# Patient Record
Sex: MALE | Race: WHITE | NOT HISPANIC OR LATINO | Employment: UNEMPLOYED | URBAN - METROPOLITAN AREA
[De-identification: names, ages, dates, MRNs, and addresses within clinical notes are randomized per-mention and may not be internally consistent; named-entity substitution may affect disease eponyms.]

---

## 2020-04-18 ENCOUNTER — APPOINTMENT (OUTPATIENT)
Dept: CARDIOLOGY | Facility: MEDICAL CENTER | Age: 45
DRG: 247 | End: 2020-04-18
Attending: INTERNAL MEDICINE
Payer: COMMERCIAL

## 2020-04-18 ENCOUNTER — HOSPITAL ENCOUNTER (INPATIENT)
Facility: MEDICAL CENTER | Age: 45
LOS: 2 days | DRG: 247 | End: 2020-04-20
Attending: EMERGENCY MEDICINE | Admitting: INTERNAL MEDICINE
Payer: COMMERCIAL

## 2020-04-18 ENCOUNTER — APPOINTMENT (OUTPATIENT)
Dept: RADIOLOGY | Facility: MEDICAL CENTER | Age: 45
DRG: 247 | End: 2020-04-18
Payer: COMMERCIAL

## 2020-04-18 ENCOUNTER — APPOINTMENT (OUTPATIENT)
Dept: CARDIOLOGY | Facility: MEDICAL CENTER | Age: 45
DRG: 247 | End: 2020-04-18
Payer: COMMERCIAL

## 2020-04-18 DIAGNOSIS — I21.3 ST ELEVATION MYOCARDIAL INFARCTION (STEMI), UNSPECIFIED ARTERY (HCC): ICD-10-CM

## 2020-04-18 DIAGNOSIS — I21.11 ST ELEVATION MYOCARDIAL INFARCTION INVOLVING RIGHT CORONARY ARTERY (HCC): ICD-10-CM

## 2020-04-18 PROBLEM — Z72.0 TOBACCO ABUSE: Status: ACTIVE | Noted: 2020-04-18

## 2020-04-18 PROBLEM — E87.1 HYPONATREMIA: Status: ACTIVE | Noted: 2020-04-18

## 2020-04-18 PROBLEM — E87.6 HYPOKALEMIA: Status: ACTIVE | Noted: 2020-04-18

## 2020-04-18 LAB
ALBUMIN SERPL BCP-MCNC: 4 G/DL (ref 3.2–4.9)
ALBUMIN/GLOB SERPL: 1.3 G/DL
ALP SERPL-CCNC: 54 U/L (ref 30–99)
ALT SERPL-CCNC: 18 U/L (ref 2–50)
ANION GAP SERPL CALC-SCNC: 17 MMOL/L (ref 7–16)
APTT PPP: 25.1 SEC (ref 24.7–36)
AST SERPL-CCNC: 47 U/L (ref 12–45)
BASOPHILS # BLD AUTO: 0 % (ref 0–1.8)
BASOPHILS # BLD: 0 K/UL (ref 0–0.12)
BILIRUB SERPL-MCNC: 0.5 MG/DL (ref 0.1–1.5)
BUN SERPL-MCNC: 12 MG/DL (ref 8–22)
BURR CELLS BLD QL SMEAR: NORMAL
CALCIUM SERPL-MCNC: 9.1 MG/DL (ref 8.5–10.5)
CHLORIDE SERPL-SCNC: 98 MMOL/L (ref 96–112)
CO2 SERPL-SCNC: 24 MMOL/L (ref 20–33)
CREAT SERPL-MCNC: 1.4 MG/DL (ref 0.5–1.4)
EKG IMPRESSION: NORMAL
EKG IMPRESSION: NORMAL
EOSINOPHIL # BLD AUTO: 0.09 K/UL (ref 0–0.51)
EOSINOPHIL NFR BLD: 0.9 % (ref 0–6.9)
ERYTHROCYTE [DISTWIDTH] IN BLOOD BY AUTOMATED COUNT: 46.9 FL (ref 35.9–50)
GLOBULIN SER CALC-MCNC: 3.1 G/DL (ref 1.9–3.5)
GLUCOSE SERPL-MCNC: 145 MG/DL (ref 65–99)
HCT VFR BLD AUTO: 43.6 % (ref 42–52)
HGB BLD-MCNC: 15 G/DL (ref 14–18)
INR PPP: 1.01 (ref 0.87–1.13)
LIPASE SERPL-CCNC: 22 U/L (ref 11–82)
LV EJECT FRACT  99904: 55
LV EJECT FRACT MOD 2C 99903: 41.67
LV EJECT FRACT MOD 4C 99902: 41.15
LV EJECT FRACT MOD BP 99901: 39.55
LYMPHOCYTES # BLD AUTO: 4.65 K/UL (ref 1–4.8)
LYMPHOCYTES NFR BLD: 44.3 % (ref 22–41)
MAGNESIUM SERPL-MCNC: 2 MG/DL (ref 1.5–2.5)
MANUAL DIFF BLD: NORMAL
MCH RBC QN AUTO: 28.5 PG (ref 27–33)
MCHC RBC AUTO-ENTMCNC: 34.4 G/DL (ref 33.7–35.3)
MCV RBC AUTO: 82.9 FL (ref 81.4–97.8)
MONOCYTES # BLD AUTO: 0.82 K/UL (ref 0–0.85)
MONOCYTES NFR BLD AUTO: 7.8 % (ref 0–13.4)
MORPHOLOGY BLD-IMP: NORMAL
NEUTROPHILS # BLD AUTO: 4.94 K/UL (ref 1.82–7.42)
NEUTROPHILS NFR BLD: 47 % (ref 44–72)
NRBC # BLD AUTO: 0 K/UL
NRBC BLD-RTO: 0 /100 WBC
NT-PROBNP SERPL IA-MCNC: 1392 PG/ML (ref 0–125)
PLATELET # BLD AUTO: 193 K/UL (ref 164–446)
PLATELET BLD QL SMEAR: NORMAL
PMV BLD AUTO: 11.2 FL (ref 9–12.9)
POIKILOCYTOSIS BLD QL SMEAR: NORMAL
POTASSIUM SERPL-SCNC: 3.2 MMOL/L (ref 3.6–5.5)
PROT SERPL-MCNC: 7.1 G/DL (ref 6–8.2)
PROTHROMBIN TIME: 13.5 SEC (ref 12–14.6)
RBC # BLD AUTO: 5.26 M/UL (ref 4.7–6.1)
RBC BLD AUTO: PRESENT
SODIUM SERPL-SCNC: 139 MMOL/L (ref 135–145)
TROPONIN T SERPL-MCNC: 402 NG/L (ref 6–19)
VARIANT LYMPHS BLD QL SMEAR: NORMAL
WBC # BLD AUTO: 10.5 K/UL (ref 4.8–10.8)

## 2020-04-18 PROCEDURE — 700111 HCHG RX REV CODE 636 W/ 250 OVERRIDE (IP)

## 2020-04-18 PROCEDURE — 80053 COMPREHEN METABOLIC PANEL: CPT

## 2020-04-18 PROCEDURE — 99285 EMERGENCY DEPT VISIT HI MDM: CPT

## 2020-04-18 PROCEDURE — 93306 TTE W/DOPPLER COMPLETE: CPT | Mod: 26 | Performed by: INTERNAL MEDICINE

## 2020-04-18 PROCEDURE — 93005 ELECTROCARDIOGRAM TRACING: CPT

## 2020-04-18 PROCEDURE — 700102 HCHG RX REV CODE 250 W/ 637 OVERRIDE(OP): Performed by: INTERNAL MEDICINE

## 2020-04-18 PROCEDURE — 770020 HCHG ROOM/CARE - TELE (206)

## 2020-04-18 PROCEDURE — 94760 N-INVAS EAR/PLS OXIMETRY 1: CPT

## 2020-04-18 PROCEDURE — 700111 HCHG RX REV CODE 636 W/ 250 OVERRIDE (IP): Performed by: INTERNAL MEDICINE

## 2020-04-18 PROCEDURE — 700102 HCHG RX REV CODE 250 W/ 637 OVERRIDE(OP): Performed by: EMERGENCY MEDICINE

## 2020-04-18 PROCEDURE — 92941 PRQ TRLML REVSC TOT OCCL AMI: CPT | Mod: RC | Performed by: INTERNAL MEDICINE

## 2020-04-18 PROCEDURE — 99223 1ST HOSP IP/OBS HIGH 75: CPT | Mod: 25 | Performed by: INTERNAL MEDICINE

## 2020-04-18 PROCEDURE — 027034Z DILATION OF CORONARY ARTERY, ONE ARTERY WITH DRUG-ELUTING INTRALUMINAL DEVICE, PERCUTANEOUS APPROACH: ICD-10-PCS | Performed by: INTERNAL MEDICINE

## 2020-04-18 PROCEDURE — 36415 COLL VENOUS BLD VENIPUNCTURE: CPT

## 2020-04-18 PROCEDURE — 85027 COMPLETE CBC AUTOMATED: CPT

## 2020-04-18 PROCEDURE — C1887 CATHETER, GUIDING: HCPCS

## 2020-04-18 PROCEDURE — 93306 TTE W/DOPPLER COMPLETE: CPT

## 2020-04-18 PROCEDURE — 83880 ASSAY OF NATRIURETIC PEPTIDE: CPT

## 2020-04-18 PROCEDURE — 700101 HCHG RX REV CODE 250

## 2020-04-18 PROCEDURE — 85610 PROTHROMBIN TIME: CPT

## 2020-04-18 PROCEDURE — 93005 ELECTROCARDIOGRAM TRACING: CPT | Performed by: EMERGENCY MEDICINE

## 2020-04-18 PROCEDURE — 97161 PT EVAL LOW COMPLEX 20 MIN: CPT

## 2020-04-18 PROCEDURE — 99152 MOD SED SAME PHYS/QHP 5/>YRS: CPT | Performed by: INTERNAL MEDICINE

## 2020-04-18 PROCEDURE — 700102 HCHG RX REV CODE 250 W/ 637 OVERRIDE(OP)

## 2020-04-18 PROCEDURE — B2111ZZ FLUOROSCOPY OF MULTIPLE CORONARY ARTERIES USING LOW OSMOLAR CONTRAST: ICD-10-PCS | Performed by: INTERNAL MEDICINE

## 2020-04-18 PROCEDURE — A9270 NON-COVERED ITEM OR SERVICE: HCPCS

## 2020-04-18 PROCEDURE — 93458 L HRT ARTERY/VENTRICLE ANGIO: CPT | Mod: 26,59 | Performed by: INTERNAL MEDICINE

## 2020-04-18 PROCEDURE — 84484 ASSAY OF TROPONIN QUANT: CPT

## 2020-04-18 PROCEDURE — 700105 HCHG RX REV CODE 258: Performed by: INTERNAL MEDICINE

## 2020-04-18 PROCEDURE — A9270 NON-COVERED ITEM OR SERVICE: HCPCS | Performed by: INTERNAL MEDICINE

## 2020-04-18 PROCEDURE — 85007 BL SMEAR W/DIFF WBC COUNT: CPT

## 2020-04-18 PROCEDURE — 700117 HCHG RX CONTRAST REV CODE 255: Performed by: INTERNAL MEDICINE

## 2020-04-18 PROCEDURE — 83690 ASSAY OF LIPASE: CPT

## 2020-04-18 PROCEDURE — 71045 X-RAY EXAM CHEST 1 VIEW: CPT

## 2020-04-18 PROCEDURE — B2151ZZ FLUOROSCOPY OF LEFT HEART USING LOW OSMOLAR CONTRAST: ICD-10-PCS | Performed by: INTERNAL MEDICINE

## 2020-04-18 PROCEDURE — 99407 BEHAV CHNG SMOKING > 10 MIN: CPT | Performed by: INTERNAL MEDICINE

## 2020-04-18 PROCEDURE — A9270 NON-COVERED ITEM OR SERVICE: HCPCS | Performed by: EMERGENCY MEDICINE

## 2020-04-18 PROCEDURE — 4A023N7 MEASUREMENT OF CARDIAC SAMPLING AND PRESSURE, LEFT HEART, PERCUTANEOUS APPROACH: ICD-10-PCS | Performed by: INTERNAL MEDICINE

## 2020-04-18 PROCEDURE — 85730 THROMBOPLASTIN TIME PARTIAL: CPT

## 2020-04-18 PROCEDURE — 83735 ASSAY OF MAGNESIUM: CPT

## 2020-04-18 RX ORDER — ONDANSETRON 4 MG/1
4 TABLET, ORALLY DISINTEGRATING ORAL EVERY 4 HOURS PRN
Status: DISCONTINUED | OUTPATIENT
Start: 2020-04-18 | End: 2020-04-20 | Stop reason: HOSPADM

## 2020-04-18 RX ORDER — LISINOPRIL 5 MG/1
2.5 TABLET ORAL
Status: DISCONTINUED | OUTPATIENT
Start: 2020-04-18 | End: 2020-04-20

## 2020-04-18 RX ORDER — MIDAZOLAM HYDROCHLORIDE 1 MG/ML
INJECTION INTRAMUSCULAR; INTRAVENOUS
Status: COMPLETED
Start: 2020-04-18 | End: 2020-04-18

## 2020-04-18 RX ORDER — ACETAMINOPHEN 325 MG/1
650 TABLET ORAL EVERY 6 HOURS PRN
Status: DISCONTINUED | OUTPATIENT
Start: 2020-04-18 | End: 2020-04-20 | Stop reason: HOSPADM

## 2020-04-18 RX ORDER — HYDROXYZINE HYDROCHLORIDE 10 MG/1
10 TABLET, FILM COATED ORAL 3 TIMES DAILY PRN
Status: DISCONTINUED | OUTPATIENT
Start: 2020-04-18 | End: 2020-04-20 | Stop reason: HOSPADM

## 2020-04-18 RX ORDER — HEPARIN SODIUM 200 [USP'U]/100ML
INJECTION, SOLUTION INTRAVENOUS
Status: COMPLETED
Start: 2020-04-18 | End: 2020-04-18

## 2020-04-18 RX ORDER — BISACODYL 10 MG
10 SUPPOSITORY, RECTAL RECTAL
Status: DISCONTINUED | OUTPATIENT
Start: 2020-04-18 | End: 2020-04-20 | Stop reason: HOSPADM

## 2020-04-18 RX ORDER — POLYETHYLENE GLYCOL 3350 17 G/17G
1 POWDER, FOR SOLUTION ORAL
Status: DISCONTINUED | OUTPATIENT
Start: 2020-04-18 | End: 2020-04-20 | Stop reason: HOSPADM

## 2020-04-18 RX ORDER — ONDANSETRON 2 MG/ML
4 INJECTION INTRAMUSCULAR; INTRAVENOUS EVERY 4 HOURS PRN
Status: DISCONTINUED | OUTPATIENT
Start: 2020-04-18 | End: 2020-04-20 | Stop reason: HOSPADM

## 2020-04-18 RX ORDER — POTASSIUM CHLORIDE 20 MEQ/1
40 TABLET, EXTENDED RELEASE ORAL 2 TIMES DAILY
Status: COMPLETED | OUTPATIENT
Start: 2020-04-18 | End: 2020-04-18

## 2020-04-18 RX ORDER — LIDOCAINE HYDROCHLORIDE 20 MG/ML
INJECTION, SOLUTION INFILTRATION; PERINEURAL
Status: COMPLETED
Start: 2020-04-18 | End: 2020-04-18

## 2020-04-18 RX ORDER — ATORVASTATIN CALCIUM 80 MG/1
80 TABLET, FILM COATED ORAL EVERY EVENING
Status: DISCONTINUED | OUTPATIENT
Start: 2020-04-18 | End: 2020-04-20 | Stop reason: HOSPADM

## 2020-04-18 RX ORDER — AMOXICILLIN 250 MG
2 CAPSULE ORAL 2 TIMES DAILY
Status: DISCONTINUED | OUTPATIENT
Start: 2020-04-18 | End: 2020-04-20 | Stop reason: HOSPADM

## 2020-04-18 RX ORDER — HEPARIN SODIUM,PORCINE 1000/ML
VIAL (ML) INJECTION
Status: COMPLETED
Start: 2020-04-18 | End: 2020-04-18

## 2020-04-18 RX ORDER — ASPIRIN 81 MG/1
324 TABLET, CHEWABLE ORAL ONCE
Status: COMPLETED | OUTPATIENT
Start: 2020-04-18 | End: 2020-04-18

## 2020-04-18 RX ORDER — NITROGLYCERIN 0.4 MG/1
0.4 TABLET SUBLINGUAL
Status: DISCONTINUED | OUTPATIENT
Start: 2020-04-18 | End: 2020-04-20 | Stop reason: HOSPADM

## 2020-04-18 RX ORDER — PRASUGREL 10 MG/1
10 TABLET, FILM COATED ORAL DAILY
Status: DISCONTINUED | OUTPATIENT
Start: 2020-04-19 | End: 2020-04-20 | Stop reason: HOSPADM

## 2020-04-18 RX ORDER — MORPHINE SULFATE 4 MG/ML
2-4 INJECTION, SOLUTION INTRAMUSCULAR; INTRAVENOUS
Status: DISCONTINUED | OUTPATIENT
Start: 2020-04-18 | End: 2020-04-20 | Stop reason: HOSPADM

## 2020-04-18 RX ORDER — PRASUGREL 10 MG/1
TABLET, FILM COATED ORAL
Status: COMPLETED
Start: 2020-04-18 | End: 2020-04-18

## 2020-04-18 RX ORDER — ASPIRIN 81 MG/1
TABLET, CHEWABLE ORAL
Status: DISPENSED
Start: 2020-04-18 | End: 2020-04-18

## 2020-04-18 RX ORDER — VERAPAMIL HYDROCHLORIDE 2.5 MG/ML
INJECTION, SOLUTION INTRAVENOUS
Status: COMPLETED
Start: 2020-04-18 | End: 2020-04-18

## 2020-04-18 RX ADMIN — PRASUGREL 60 MG: 10 TABLET, FILM COATED ORAL at 05:33

## 2020-04-18 RX ADMIN — LISINOPRIL 2.5 MG: 5 TABLET ORAL at 11:18

## 2020-04-18 RX ADMIN — MIDAZOLAM HYDROCHLORIDE 2 MG: 1 INJECTION, SOLUTION INTRAMUSCULAR; INTRAVENOUS at 05:16

## 2020-04-18 RX ADMIN — VERAPAMIL HYDROCHLORIDE 2.5 MG: 2.5 INJECTION, SOLUTION INTRAVENOUS at 05:12

## 2020-04-18 RX ADMIN — FENTANYL CITRATE 50 MCG: 0.05 INJECTION, SOLUTION INTRAMUSCULAR; INTRAVENOUS at 05:26

## 2020-04-18 RX ADMIN — ASPIRIN 81 MG 324 MG: 81 TABLET ORAL at 04:45

## 2020-04-18 RX ADMIN — IOHEXOL 105 ML: 350 INJECTION, SOLUTION INTRAVENOUS at 05:33

## 2020-04-18 RX ADMIN — METOPROLOL TARTRATE 12.5 MG: 25 TABLET, FILM COATED ORAL at 11:17

## 2020-04-18 RX ADMIN — BIVALIRUDIN 1.75 MG/KG/HR: 250 INJECTION, POWDER, LYOPHILIZED, FOR SOLUTION INTRAVENOUS at 05:30

## 2020-04-18 RX ADMIN — ATORVASTATIN CALCIUM 80 MG: 80 TABLET, FILM COATED ORAL at 17:57

## 2020-04-18 RX ADMIN — LIDOCAINE HYDROCHLORIDE: 20 INJECTION, SOLUTION INFILTRATION; PERINEURAL at 05:00

## 2020-04-18 RX ADMIN — HYDROXYZINE HYDROCHLORIDE 10 MG: 10 TABLET, FILM COATED ORAL at 18:00

## 2020-04-18 RX ADMIN — HEPARIN SODIUM: 1000 INJECTION, SOLUTION INTRAVENOUS; SUBCUTANEOUS at 05:10

## 2020-04-18 RX ADMIN — METOPROLOL TARTRATE 12.5 MG: 25 TABLET, FILM COATED ORAL at 17:59

## 2020-04-18 RX ADMIN — NITROGLYCERIN 10 ML: 20 INJECTION INTRAVENOUS at 05:10

## 2020-04-18 RX ADMIN — HEPARIN SODIUM 2000 UNITS: 200 INJECTION, SOLUTION INTRAVENOUS at 05:10

## 2020-04-18 RX ADMIN — POTASSIUM CHLORIDE 40 MEQ: 1500 TABLET, EXTENDED RELEASE ORAL at 11:18

## 2020-04-18 RX ADMIN — POTASSIUM CHLORIDE 40 MEQ: 1500 TABLET, EXTENDED RELEASE ORAL at 17:57

## 2020-04-18 ASSESSMENT — COGNITIVE AND FUNCTIONAL STATUS - GENERAL
MOBILITY SCORE: 24
DAILY ACTIVITIY SCORE: 24
SUGGESTED CMS G CODE MODIFIER DAILY ACTIVITY: CH
SUGGESTED CMS G CODE MODIFIER MOBILITY: CH
MOBILITY SCORE: 24
SUGGESTED CMS G CODE MODIFIER MOBILITY: CH

## 2020-04-18 ASSESSMENT — ENCOUNTER SYMPTOMS
FLANK PAIN: 0
SHORTNESS OF BREATH: 1
FOCAL WEAKNESS: 0
ABDOMINAL PAIN: 0
VOMITING: 0
PALPITATIONS: 0
WHEEZING: 0
MYALGIAS: 0
BLOOD IN STOOL: 0
HEADACHES: 0
DIARRHEA: 0
CHILLS: 0
NAUSEA: 0
DIZZINESS: 0
BRUISES/BLEEDS EASILY: 0
FEVER: 0
SORE THROAT: 0
DIAPHORESIS: 0
COUGH: 0
NECK PAIN: 0
SEIZURES: 0
BACK PAIN: 0
SPUTUM PRODUCTION: 0
BLURRED VISION: 0

## 2020-04-18 ASSESSMENT — PATIENT HEALTH QUESTIONNAIRE - PHQ9
2. FEELING DOWN, DEPRESSED, IRRITABLE, OR HOPELESS: NOT AT ALL
1. LITTLE INTEREST OR PLEASURE IN DOING THINGS: NOT AT ALL
SUM OF ALL RESPONSES TO PHQ9 QUESTIONS 1 AND 2: 0
SUM OF ALL RESPONSES TO PHQ9 QUESTIONS 1 AND 2: 0
2. FEELING DOWN, DEPRESSED, IRRITABLE, OR HOPELESS: NOT AT ALL
1. LITTLE INTEREST OR PLEASURE IN DOING THINGS: NOT AT ALL

## 2020-04-18 ASSESSMENT — COPD QUESTIONNAIRES
DO YOU EVER COUGH UP ANY MUCUS OR PHLEGM?: NO/ONLY WITH OCCASIONAL COLDS OR INFECTIONS
COPD SCREENING SCORE: 2
HAVE YOU SMOKED AT LEAST 100 CIGARETTES IN YOUR ENTIRE LIFE: YES
DURING THE PAST 4 WEEKS HOW MUCH DID YOU FEEL SHORT OF BREATH: NONE/LITTLE OF THE TIME
IN THE PAST 12 MONTHS DO YOU DO LESS THAN YOU USED TO BECAUSE OF YOUR BREATHING PROBLEMS: DISAGREE/UNSURE

## 2020-04-18 ASSESSMENT — LIFESTYLE VARIABLES
ALCOHOL_USE: NO
TOTAL SCORE: 0
EVER FELT BAD OR GUILTY ABOUT YOUR DRINKING: NO
AVERAGE NUMBER OF DAYS PER WEEK YOU HAVE A DRINK CONTAINING ALCOHOL: 0
ON A TYPICAL DAY WHEN YOU DRINK ALCOHOL HOW MANY DRINKS DO YOU HAVE: 0
HAVE PEOPLE ANNOYED YOU BY CRITICIZING YOUR DRINKING: NO
TOTAL SCORE: 0
TOTAL SCORE: 0
CONSUMPTION TOTAL: NEGATIVE
EVER_SMOKED: YES
HAVE YOU EVER FELT YOU SHOULD CUT DOWN ON YOUR DRINKING: NO
HOW MANY TIMES IN THE PAST YEAR HAVE YOU HAD 5 OR MORE DRINKS IN A DAY: 0
EVER HAD A DRINK FIRST THING IN THE MORNING TO STEADY YOUR NERVES TO GET RID OF A HANGOVER: NO

## 2020-04-18 ASSESSMENT — GAIT ASSESSMENTS
DISTANCE (FEET): 300
DEVIATION: OTHER (COMMENT)
GAIT LEVEL OF ASSIST: SUPERVISED

## 2020-04-18 ASSESSMENT — FIBROSIS 4 INDEX: FIB4 SCORE: 2.64

## 2020-04-18 NOTE — PROGRESS NOTES
2 RN skin check complete with Ezra MEDINA.   Devices in place n/a.  Skin assessed under devices n/a.  Confirmed pressure ulcers found on n/a.  New potential pressure ulcers noted on n/a. Wound consult placed.  The following interventions in place pillows in use for support/postion.    Bilateral ears pink and blanching.  Bilateral elbows dry and intact.  Sacrum intact.  Bilateral heels dry and cracked.  Generalized scarring.

## 2020-04-18 NOTE — LETTER
[facility]  TELEMETRY [address] [facility]  [place] [address]  [phone]     April 20, 2020    Patient: Immanuel Borges   YOB: 1975   Date of Visit: 4/18/2020       To Whom It May Concern:    Immanuel Borges was admitted to [facility] on and treated in our department on 4/18/2020 and discharged on 4/20/20. Only restriction is no lifting anything greater than 8 lbs with his right hand until 4/25/20. Please let us know if you have any questions or concerns.     Sincerely,         MARILYN Mathias.

## 2020-04-18 NOTE — ED NOTES
0413 - EKG performed in ER room 12. Pt reports 10/10 midsternal CP.  0414 - ERP Dr. Tamez at bedside  0415 - STEMI paged  0419 - PIV started to right forearm. No blood return. PIV flushes well.  0420 - PRASAD Ambrose RN at bedside  0421 - PIV started to left upper arm  0421 - Blood given to lab at bedside  0422 - Pt reports 4/10 CP  0423 - Agnes at bedside  0424 - Xray at bedside  0443 - Cardiology MD at bedside  0446 - Pt to cath lab

## 2020-04-18 NOTE — ASSESSMENT & PLAN NOTE
Came with chest pain and ST elevation on inferior leads   patient will undergo emergent cardiac catheterization and stent on RCA  Echo showed normal ejection fraction 55% with Inferior wall segment appears hypokinetic  Continue aspirin and Effient with statin  Start with low-dose of beta-blockers and ACE inhibitors; tolerating well  Continue monitoring for today and possible discharge tomorrow  Cardiac rehab as outpatient

## 2020-04-18 NOTE — PROGRESS NOTES
Cardiology note    Called re concern for STEMI. 46 y/o M with no medical history, smoker, with 1 hour of chest pain.    ECG reviewed: Inferior STEMI.    Dr Contreras called and notified.    - Plan LHC with PCI  - Admit to medicine with cardiology consult to follow    Lamin Singh MD  Cardiac Electrophysiology

## 2020-04-18 NOTE — PROCEDURES
DATE OF SERVICE:  04/18/2020    REFERRING PHYSICIAN: Samuel Tamez MD    PROCEDURES:    1.  Left heart catheterization.  2.  Coronary angiography.    3.  PTCA/stent placement of the proximal right coronary artery.    4.  Left ventriculogram.    5.  Monitor conscious sedation.      PREPROCEDURE DIAGNOSES:  Inferior myocardial infarction, STEMI    POSTPROCEDURE DIAGNOSES:    1.  Single-vessel coronary artery disease with high-grade proximal right   coronary artery stenosis.    2.  Successful percutaneous transluminal coronary angioplasty/stent placement   of the proximal right coronary artery with 3.0x22 mm [place] drug-eluting stent.   3.  Reduced left ventricular systolic function with ejection fraction of 45%.   4.  Elevated left ventricular end-diastolic pressure.      INDICATION:  The patient is a 45-year-old male with past medical history   significant for chronic tobacco abuse.  The patient was brought to [facility] Emergency Room with chest pain and EKG confirmed acute   inferior myocardial infarction.  The patient was brought to cardiac catheterization   laboratory with STEMI activation.      DESCRIPTION OF PROCEDURE:  The patient was brought to the cardiac   catheterization laboratory.  He was prepped and draped in the usual sterile   manner.  The right wrist area was anesthetized with 2% Xylocaine.  A 6-[place]   sheath was inserted into the right radial artery using modified Seldinger   technique.  Intra-arterial verapamil and nitroglycerin were given.  IV heparin  was given.  A 4-[place] pigtail catheter was positioned into the left ventricle.    Left angiography was performed.  This was exchanged for a 4-[place]  JL 3.5 catheter, which was positioned into the left main coronary artery.    Coronary angiography was performed.  This was exchanged for a 6-[place]   R4 guide catheter, which was positioned into the right coronary artery.  IV   Angiomax was started.  A navabi wire was used to cross  the identified   stenosis.  The stenosis was predilated with 2.5x15 mm Emerge balloon.   A 3.0x22 mm [place] drug-eluting stent was successfully positioned and   deployed.  The stent was postdilated with 3.25x15 mm [place] Euphora balloon.    The patient tolerated the procedure well.  At the end of the procedure, all   wires, balloons, guide, and sheaths were removed.  Hemoband was placed   on the right wrist.  He was given oral Effient and transferred to telemetry in   stable condition.      HEMODYNAMIC DATA:  Hemodynamic data shows aortic pressures of 100/60 with mean   of 80 mmHg and /15 with LVEDP of 20 mmHg.      AORTIC VALVE:  There was no significant gradient noted.      LEFT VENTRICULOGRAM:  A 10 mL of contrast was delivered for 3 seconds.    Ejection fraction was estimated to be 45%.  There was diaphragmatic   hypokinesis noted.      ANGIOGRAM:    1.  Left main coronary artery:  Left main coronary artery is a short, moderate   to large-caliber vessel free of disease.    2.  Left anterior descending artery:  Left anterior descending artery is a   long large-caliber vessel with mild tortuosity wrapping the apex.  There are 2   small-caliber bifurcating diagonal branches noted.  Left anterior descending   artery and its branches are free of disease.    3.  Ramus intermedius:  Ramus intermedius is a long large-caliber bifurcating   vessel free of disease.    4.  Left circumflex artery:  Left circumflex artery is a nondominant   moderate-caliber vessel with very small obtuse marginal branches x3.  Left   circumflex artery and its branches are free of disease.    5.  Right coronary artery:  Right coronary artery is a dominant large-caliber   vessel, which is occluded at the proximal portion.  Distally, there is a   moderate-caliber posterior descending artery branch and small posterolateral   branches noted free of disease.      PERCUTANEOUS INTERVENTION:    Proximal right coronary artery occlusion with 0%  residual.    BOBBY-0 flow to BOBBY-3 flow.    Predilatation with 2.5x15 mm Emerge balloon.    Stent with 3.0x22 mm [place] drug-eluting stent.    Post-dilatation with 3.25x15 mm NC Euphora balloon.      IMPRESSION:    1.  Single-vessel coronary artery disease with high-grade proximal right   coronary artery stenosis.    2.  Successful percutaneous transluminal coronary angioplasty/stent placement   of the proximal right coronary artery with 3.0x22 mm [place] drug-eluting stent.   3.  Reduced left ventricular systolic function with ejection fraction of 45%.   4.  Elevated left ventricular end-diastolic pressure.      RECOMMENDATIONS:  Recommend medical therapy with addition of Effient and   smoking cessation.      SEDATION TIME: The patient's sedation was managed by myself with continuous   face to face time with the patient for 15 minutes from 04:20 to 04:35.         ____________________________________     MD CLEMENTINA PADILLA / EARLENE    DD:  04/18/2020 05:40:38  DT:  04/18/2020 06:12:58    D#:  [id number]  Job#:  [id number]

## 2020-04-18 NOTE — PROGRESS NOTES
Pt arrived from cath lab awake alert and oriented x 4. VSS. Placed on telemetry box and verified by monitor room SR, 72.  Angiomax infusing. Puncture site to right wrist CDI with hemoband in place inflated to 13 mL.

## 2020-04-18 NOTE — H&P
Hospital Medicine History & Physical Note    Date of Service  4/18/2020    Primary Care Physician  No primary care provider on file.    Code Status  Full Code    Chief Complaint  Chief Complaint   Patient presents with   • Chest Pain       History of Presenting Illness  45 y.o. male who presented 4/18/2020 with onset of chest pain started prior to arrival.  The patient was in his usual state of health when he suddenly developed substernal chest pain with radiation down his arms.  He reported associated shortness of breath.  He denies any fevers, chills or cough.  He reports smoking 5 cigarettes daily.  He smokes marijuana occasionally.  He drinks alcohol occasionally.  He denies cocaine or methamphetamine use.  He denies any family history of MI.    EKG interpreted by me reveals inferior STEMI  Chest x-ray interpreted by me reveals no evidence of pneumonia or pulmonary edema    Review of Systems  Review of Systems   Constitutional: Negative for chills, diaphoresis and fever.   HENT: Negative for hearing loss and sore throat.    Eyes: Negative for blurred vision.   Respiratory: Positive for shortness of breath. Negative for cough, sputum production and wheezing.    Cardiovascular: Positive for chest pain. Negative for palpitations and leg swelling.   Gastrointestinal: Negative for abdominal pain, blood in stool, diarrhea, nausea and vomiting.   Genitourinary: Negative for dysuria, flank pain and urgency.   Musculoskeletal: Negative for back pain, joint pain, myalgias and neck pain.   Skin: Negative for rash.   Neurological: Negative for dizziness, focal weakness, seizures and headaches.   Endo/Heme/Allergies: Does not bruise/bleed easily.   Psychiatric/Behavioral: Negative for suicidal ideas.   All other systems reviewed and are negative.      Past Medical History  Tobacco abuse    Surgical History  No pertinent surgical history    Family History  No pertinent family history    Social History   reports that he has  been smoking cigarettes. He has never used smokeless tobacco. He reports previous alcohol use. He reports current drug use.    Allergies  No Known Allergies    Medications  None       Physical Exam  Temp:  [36.6 °C (97.8 °F)] 36.6 °C (97.8 °F)  Pulse:  [60-68] 66  Resp:  [18-22] 18  BP: ()/(51-83) 117/78  SpO2:  [98 %-100 %] 100 %    Physical Exam  Vitals signs and nursing note reviewed.   Constitutional:       General: He is in acute distress.   HENT:      Head: Normocephalic and atraumatic.      Nose: Nose normal.      Mouth/Throat:      Mouth: Mucous membranes are moist.   Eyes:      Extraocular Movements: Extraocular movements intact.      Conjunctiva/sclera: Conjunctivae normal.      Pupils: Pupils are equal, round, and reactive to light.   Neck:      Musculoskeletal: Normal range of motion and neck supple.   Cardiovascular:      Rate and Rhythm: Normal rate and regular rhythm.      Pulses: Normal pulses.      Heart sounds: Normal heart sounds.   Pulmonary:      Effort: Pulmonary effort is normal. No respiratory distress.      Breath sounds: Normal breath sounds. No wheezing, rhonchi or rales.   Abdominal:      General: Bowel sounds are normal. There is no distension.      Palpations: Abdomen is soft.      Tenderness: There is no abdominal tenderness.   Musculoskeletal: Normal range of motion.         General: No swelling or tenderness.   Lymphadenopathy:      Cervical: No cervical adenopathy.   Skin:     General: Skin is warm.      Coloration: Skin is not jaundiced.      Findings: No rash.   Neurological:      General: No focal deficit present.      Mental Status: He is alert and oriented to person, place, and time.      Cranial Nerves: No cranial nerve deficit.      Motor: No weakness.   Psychiatric:         Mood and Affect: Mood normal.         Behavior: Behavior normal.         Laboratory:  Recent Labs     04/18/20  0424   WBC 10.5   RBC 5.26   HEMOGLOBIN 15.0   HEMATOCRIT 43.6   MCV 82.9   MCH 28.5    MCHC 34.4   RDW 46.9   PLATELETCT 193   MPV 11.2     Recent Labs     04/18/20 0424   SODIUM 139   POTASSIUM 3.2*   CHLORIDE 98   CO2 24   GLUCOSE 145*   BUN 12   CREATININE 1.40   CALCIUM 9.1     Recent Labs     04/18/20 0424   ALTSGPT 18   ASTSGOT 47*   ALKPHOSPHAT 54   TBILIRUBIN 0.5   LIPASE 22   GLUCOSE 145*     Recent Labs     04/18/20 0424   APTT 25.1   INR 1.01     Recent Labs     04/18/20 0424   NTPROBNP 1392*         Recent Labs     04/18/20 0424   TROPONINT 402*       Imaging:  DX-CHEST-PORTABLE (1 VIEW)   Final Result      Upper normal heart size with no consolidation identified      CL-LEFT HEART CATHETERIZATION WITH POSSIBLE INTERVENTION    (Results Pending)   EC-ECHOCARDIOGRAM COMPLETE W/O CONT    (Results Pending)         Assessment/Plan:  I anticipate greater than 2 midnights of inpatient medical management.    STEMI (ST elevation myocardial infarction) (HCC)- (present on admission)  Assessment & Plan  Patient will undergo emergent cardiac catheterization  Admit to the telemetry unit with close cardiac monitoring, serial EKG and troponin  Patient has been given full dose of aspirin and is started on IV heparin, monitor APTT  Hold beta-blockers as blood pressures currently marginal  I will start the patient on atorvastatin 40 mg daily  Check 2D echo, lipid panel, TSH and hemoglobin A1c  Nitro and morphine when necessary for chest pain        Hypokalemia- (present on admission)  Assessment & Plan  Replace with oral K. Dur  Monitor BMP      Tobacco abuse- (present on admission)  Assessment & Plan  Tobacco cessation education provided for more than 11 minutes.  We discussed the risks of smoking including increased risk of heart disease, stroke, cancer and COPD. We discussed the benefits of quitting smoking. We discussed options of nicotine patch, wellbutrin and chantix.  The patient has the intention to quit smoking.  Avoid nicotine replacement at this time    DVT prophylaxis: Heparin

## 2020-04-18 NOTE — PROGRESS NOTES
Bedside report received from night shift RN. Assumed care. Pt is A&O x 4 but groggy from surgery. Pt is in bed resting. Pt denies pain at this time. Pt was updated on plan of care. Pt has call light, personal belongings, and bedside table within reach. Bed is in the lowest position and locked. Will continue to monitor.

## 2020-04-18 NOTE — DISCHARGE PLANNING
Medical Social Work     Referral: STEMI    SW responded to a STEMI. The pt was BIB his wife. The pt name is Immanuel Borges (: 1975). The pt emergency contact is Yenni (wife) [phone].     CHANTELLE met with the pt wife in the lobby and advised her the pt is going to Cath Lab and that we would update her once the pt was done in Cath Lab CHANTELLE provided Yenni with SW contact information.     Plan: CHANTELLE will remain available for pt support.

## 2020-04-18 NOTE — ED PROVIDER NOTES
"ED Provider Note    Scribed for No att. providers found by Rick Mcpherson. 4/18/2020, 4:15 AM.    Primary care provider: No primary care provider.  Means of arrival: Walk-in  History obtained from: Patient  History limited by: None    CHIEF COMPLAINT  Chief Complaint   Patient presents with   • Chest Pain       HPI  Immanuel Borges is a 45 y.o. male who presents to the Emergency Department for evaluation of acute, sharp chest pain onset prior to arrival. He was eating salami and crackers when the pain began. It is now radiating to his arms and shoulders. He states that he has not had symptoms like this before. He smokes 3-5 tobacco cigarettes daily and marijuana minimally, and does drink alcohol occasionally. He denies any medical problems and does not take any daily medications. He does not know of any family history of heart disease. He does not do any other drugs.     REVIEW OF SYSTEMS  See HPI for further details. All other systems are negative.     PAST MEDICAL HISTORY       SURGICAL HISTORY  patient denies any surgical history    SOCIAL HISTORY  Social History     Tobacco Use   • Smoking status: Current Every Day Smoker     Types: Cigarettes   • Smokeless tobacco: Never Used   Substance Use Topics   • Alcohol use: Not Currently   • Drug use: Yes     Comment: marijuana      Social History     Substance and Sexual Activity   Drug Use Yes    Comment: marijuana       FAMILY HISTORY  History reviewed. No pertinent family history.    CURRENT MEDICATIONS  Reviewed.  See Encounter Summary.     ALLERGIES  No Known Allergies    PHYSICAL EXAM  VITAL SIGNS: /62   Pulse 68   Resp 18   Ht 1.676 m (5' 6\")   Wt 77.1 kg (170 lb)   SpO2 98%   BMI 27.44 kg/m²   Constitutional: Alert in no apparent distress.  HENT: No signs of trauma, Bilateral external ears normal, Nose normal.   Eyes: Pupils are equal and reactive, Conjunctiva normal, Non-icteric.   Neck: Normal range of motion, No tenderness, Supple, No stridor. "   Cardiovascular: Regular rate and rhythm, no murmurs.   Thorax & Lungs: Normal breath sounds, No respiratory distress, No wheezing, No chest tenderness.   Abdomen: Bowel sounds normal, Soft, No tenderness, No masses, No pulsatile masses. No peritoneal signs.  Skin: Warm, Dry, No erythema, No rash.   Back: No bony tenderness, No CVA tenderness.   Extremities: Intact distal pulses, No edema, No tenderness, No cyanosis  Musculoskeletal: Good range of motion in all major joints. No tenderness to palpation or major deformities noted.   Neurologic: Alert , Normal motor function, Normal sensory function, No focal deficits noted.   Psychiatric: Affect normal, Judgment normal, Mood normal.     DIAGNOSTIC STUDIES / PROCEDURES     LABS  Results for orders placed or performed during the hospital encounter of 04/18/20   CBC WITH DIFFERENTIAL   Result Value Ref Range    WBC 10.5 4.8 - 10.8 K/uL    RBC 5.26 4.70 - 6.10 M/uL    Hemoglobin 15.0 14.0 - 18.0 g/dL    Hematocrit 43.6 42.0 - 52.0 %    MCV 82.9 81.4 - 97.8 fL    MCH 28.5 27.0 - 33.0 pg    MCHC 34.4 33.7 - 35.3 g/dL    RDW 46.9 35.9 - 50.0 fL    Platelet Count 193 164 - 446 K/uL    MPV 11.2 9.0 - 12.9 fL    Neutrophils-Polys 47.00 44.00 - 72.00 %    Lymphocytes 44.30 (H) 22.00 - 41.00 %    Monocytes 7.80 0.00 - 13.40 %    Eosinophils 0.90 0.00 - 6.90 %    Basophils 0.00 0.00 - 1.80 %    Nucleated RBC 0.00 /100 WBC    Neutrophils (Absolute) 4.94 1.82 - 7.42 K/uL    Lymphs (Absolute) 4.65 1.00 - 4.80 K/uL    Monos (Absolute) 0.82 0.00 - 0.85 K/uL    Eos (Absolute) 0.09 0.00 - 0.51 K/uL    Baso (Absolute) 0.00 0.00 - 0.12 K/uL    NRBC (Absolute) 0.00 K/uL   PROTHROMBIN TIME   Result Value Ref Range    PT 13.5 12.0 - 14.6 sec    INR 1.01 0.87 - 1.13   APTT   Result Value Ref Range    APTT 25.1 24.7 - 36.0 sec   DIFFERENTIAL MANUAL   Result Value Ref Range    Manual Diff Status PERFORMED    PERIPHERAL SMEAR REVIEW   Result Value Ref Range    Peripheral Smear Review see below     PLATELET ESTIMATE   Result Value Ref Range    Plt Estimation Normal    MORPHOLOGY   Result Value Ref Range    RBC Morphology Present     Poikilocytosis 1+     Echinocytes 1+     Reactive Lymphocytes Moderate    EKG   Result Value Ref Range    Report       [facility] Emergency Dept.    Test Date:  2020  Pt Name:    LOIS CRENSHAW                  Department: ER  MRN:        [id number]                      Room:       RD 12  Gender:     Male                         Technician: [id number]  :        1975                   Requested By:ER TRIAGE PROTOCOL  Order #:    [id number]                    Reading MD:    Measurements  Intervals                                Axis  Rate:       65                           P:  NC:                                      QRS:        56  QRSD:       90                           T:          110  QT:         484  QTc:        504    Interpretive Statements  ATRIAL FLUTTER, A-RATE 242  PROBABLE LVH WITH SECONDARY REPOL ABNRM  INFERIOR INJURY, PROBABLE EARLY ACUTE INFARCT  PROLONGED QT INTERVAL  No previous ECG available for comparison       All labs were reviewed by me.    EKG  12 Lead EKG interpreted by me as above.      RADIOLOGY  CL-LEFT HEART CATHETERIZATION WITH POSSIBLE INTERVENTION    (Results Pending)   DX-CHEST-PORTABLE (1 VIEW)    (Results Pending)     The radiologist's interpretation of all radiological studies and images have been reviewed by me.    COURSE & MEDICAL DECISION MAKING  Pertinent Labs & Imaging studies reviewed. (See chart for details)    4:15 AM - Patient seen and examined at bedside. Patient will be treated with aspirin. Ordered Troponin, BNP, CBC with differential, CMP, Lipase, Prothrombin time, APTT, DX-chest, CL-left heart catheterization possible intervention, and EKG to evaluate his symptoms.     4:24 AM - 4:26 AM I discussed the patient's case and the above findings with Dr. Singh (Cardiology) who agrees with cath lab. Dr. Alarcon  (Hospitalist) agrees to evaluate for hospitalization. Dr. Contreras has been called.     CRITICAL CARE  The very real possibilty of a deterioration of this patient's condition required the highest level of my preparedness for sudden, emergent intervention.  I provided critical care services, which included medication orders, frequent reevaluations of the patient's condition and response to treatment, ordering and reviewing test results, and discussing the case with various consultants.  The critical care time associated with the care of the patient was 30 minutes. Review chart for interventions. This time is exclusive of any other billable procedures.     Decision Making:  This is a 45 y.o. year old male who presents with acute chest pain.  I evaluate the patient that was emergently brought back from triage.  Upon completion of EKG there was evidence of acute inferior STEMI with lateral reciprocal changes.  Cardiology was emergently consulted and they independently reviewed the EKG and agree of diagnosis.  Dr. Minor from interventional cardiology and Cath Lab team have both also been called emergently for further definitive care.  I discussed case with the hospitalist Dr. Alarcon which will continue inpatient care as well.    DISPOSITION:  Patient will be hospitalized by Dr. Alarcon in guarded condition.      FINAL IMPRESSION  Acute inferior STEMI  History of tobacco use  History of marijuana use     I, Rick Mcpherson (Scrib[employer]arcus), am scribing for, and in the presence of, No att. providers found.    Electronically signed by: Rcik Mcpherson ([place]), 4/18/2020    I, No att. providers found personally performed the services described in this documentation, as scribed by Rick Mcpherson in my presence, and it is both accurate and complete. C    The note accurately reflects work and decisions made by me.  Samuel Tamez M.D.  4/18/2020  5:14 AM

## 2020-04-18 NOTE — ED TRIAGE NOTES
"Immanuel Borges  45 y.o. male  Chief Complaint   Patient presents with   • Chest Pain       Pt to ER via POV c/o 10/10 midsternal CP, onset approx. 0345 this morning, after eating. Pt assisted out of vehicle, due to generalized weakness, then wheeled to ER room 12 via w/c.    /55   Pulse 68   Temp 36.6 °C (97.8 °F) (Temporal)   Resp (!) 22   Ht 1.676 m (5' 6\")   Wt 77.1 kg (170 lb)   SpO2 100%     "

## 2020-04-18 NOTE — PROGRESS NOTES
45-year-old male with no significant past medical history presented 4/18 with chest pain, the patient does not have diabetes or hypertension no family history of coronary artery disease, he developed severe pressure chest pain, on admission EKG showed ST elevation in the inferior leads, the patient was transfer to cardiac cath and a stent was placed on the RCA, the patient has been doing better after cardiac cath, no significant chest pain, echo after the cardiac cath showed normal ejection fraction 55% with inferior wall segment hypokinesis.  We will continue aspirin and added Effient and continue atorvastatin  Refer the patient to cardiac rehab  Started with low-dose of lisinopril and metoprolol as tolerated.  Potassium was replaced  Will check lipid panel and A1c tomorrow

## 2020-04-18 NOTE — DISCHARGE PLANNING
Medical Social Work     SW called and updated the pt wife with the pt room number and unit phone number. SW advised the pt wife Yenni that the RN would be able to provided her with a medical update on the pt medical status.     Plan: SW will remain available for pt and family support.

## 2020-04-18 NOTE — ASSESSMENT & PLAN NOTE
Tobacco cessation education provided for more than 11 minutes.  We discussed the risks of smoking including increased risk of heart disease, stroke, cancer and COPD. We discussed the benefits of quitting smoking. We discussed options of nicotine patch, wellbutrin and chantix.  The patient has the intention to quit smoking.  Avoid nicotine replacement at this time

## 2020-04-18 NOTE — THERAPY
"Physical Therapy Evaluation completed.   Bed Mobility:  Supine to Sit: Supervised  Transfers: Sit to Stand: Supervised  Gait: Level Of Assist: Supervised with No Equipment Needed       Plan of Care: Patient with no further skilled PT needs in the acute care setting at this time  Discharge Recommendations: Equipment: No Equipment Needed. See below    After initial evaluation and pt education, pt has no further acute PT needs. He presents s/p STEMI with subsequent cath and stenting with preserved EF. He was given education re: self monitoring and use of RPE/talk test for activity modification and progression at time of dc from acute setting. Given nature of pt's employment as  would recommend outpatient cardiac rehab for continued education and monitoring for activity progression with regards to cardiac function. He appears functionally capable of dc to home once medically cleared.       See \"Rehab Therapy-Acute\" Patient Summary Report for complete documentation.     "

## 2020-04-18 NOTE — CONSULTS
DATE OF SERVICE:  04/18/2020    CARDIOLOGY CONSULTATION    REFERRING PHYSICIAN:  Samuel Tamez MD    CHIEF COMPLAINT:  Acute inferior myocardial infarction, ST elevation   myocardial infarction.    HISTORY OF PRESENT ILLNESS:  The patient is a 45-year-old male with   past medical history significant for chronic tobacco abuse.  The patient was   brought to [facility] experiencing chest pain, which occurred   this morning at about 12:00.  He describes his chest pain to be a mid chest   pressure sensation, 3/10 in severity.  He denies associated shortness of   breath, nausea, vomiting or diaphoresis.  He was brought to [facility] and EKG confirmed inferior myocardial infarction.  STEMI was activated.    ALLERGIES:  No known drug allergies.    MEDICATIONS:  None.    PAST MEDICAL HISTORY:  None.    PAST SURGICAL HISTORY:  Hip surgery.    SOCIAL HISTORY:  Positive for tobacco abuse.    FAMILY HISTORY:  Negative for coronary artery disease.    REVIEW OF SYSTEMS:  Full review of system unable to be obtained due to his   critical state.    PHYSICAL EXAMINATION:  VITAL SIGNS:  Pulse 66 beats per minute, respiration 18 breaths per minute, BP   117/78 mmHg, temperature 97.8 degrees Fahrenheit.  GENERAL:  The patient is a 45-year-old male who appears to be in pain.  (Phyical Examination is limited due to COVID precaution)    CARDIAC STUDIES AND PROCEDURES:      EKG performed on (04/18/20) was reviewed: EKG personally interpreted shows   sinus rhythm with 3-4 mm ST segment elevation of the inferior leads.    ASSESSMENT AND PLAN:  1.  Acute inferior myocardial infarction/ST elevation myocardial infarction:    The patient is a 45-year-old male with chronic tobacco abuse.  He was brought   in for chest pain and EKG confirmed inferior myocardial infarction.  STEMI was  activated.  He will be brought to cardiac catheterization for urgent   percutaneous intervention.  2.  Chronic tobacco abuse:  Smoking  cessation will be recommended.    Thank you for this consult.       ____________________________________     MD CLEMENTINA PADILLA / EARLENE    DD:  04/18/2020 05:45:53  DT:  04/18/2020 06:33:53    D#:  [id number]  Job#:  [id number]

## 2020-04-19 LAB
ANION GAP SERPL CALC-SCNC: 10 MMOL/L (ref 7–16)
BASOPHILS # BLD AUTO: 0.3 % (ref 0–1.8)
BASOPHILS # BLD: 0.02 K/UL (ref 0–0.12)
BUN SERPL-MCNC: 10 MG/DL (ref 8–22)
CALCIUM SERPL-MCNC: 8.6 MG/DL (ref 8.5–10.5)
CHLORIDE SERPL-SCNC: 105 MMOL/L (ref 96–112)
CHOLEST SERPL-MCNC: 128 MG/DL (ref 100–199)
CO2 SERPL-SCNC: 24 MMOL/L (ref 20–33)
CREAT SERPL-MCNC: 1.06 MG/DL (ref 0.5–1.4)
EOSINOPHIL # BLD AUTO: 0.01 K/UL (ref 0–0.51)
EOSINOPHIL NFR BLD: 0.2 % (ref 0–6.9)
ERYTHROCYTE [DISTWIDTH] IN BLOOD BY AUTOMATED COUNT: 46.3 FL (ref 35.9–50)
EST. AVERAGE GLUCOSE BLD GHB EST-MCNC: 105 MG/DL
GLUCOSE SERPL-MCNC: 101 MG/DL (ref 65–99)
HBA1C MFR BLD: 5.3 % (ref 0–5.6)
HCT VFR BLD AUTO: 40.2 % (ref 42–52)
HDLC SERPL-MCNC: 37 MG/DL
HGB BLD-MCNC: 13.9 G/DL (ref 14–18)
IMM GRANULOCYTES # BLD AUTO: 0.01 K/UL (ref 0–0.11)
IMM GRANULOCYTES NFR BLD AUTO: 0.2 % (ref 0–0.9)
LDLC SERPL CALC-MCNC: 65 MG/DL
LYMPHOCYTES # BLD AUTO: 2.49 K/UL (ref 1–4.8)
LYMPHOCYTES NFR BLD: 40 % (ref 22–41)
MAGNESIUM SERPL-MCNC: 2 MG/DL (ref 1.5–2.5)
MCH RBC QN AUTO: 29 PG (ref 27–33)
MCHC RBC AUTO-ENTMCNC: 34.6 G/DL (ref 33.7–35.3)
MCV RBC AUTO: 83.9 FL (ref 81.4–97.8)
MONOCYTES # BLD AUTO: 0.5 K/UL (ref 0–0.85)
MONOCYTES NFR BLD AUTO: 8 % (ref 0–13.4)
NEUTROPHILS # BLD AUTO: 3.2 K/UL (ref 1.82–7.42)
NEUTROPHILS NFR BLD: 51.3 % (ref 44–72)
NRBC # BLD AUTO: 0 K/UL
NRBC BLD-RTO: 0 /100 WBC
PLATELET # BLD AUTO: 139 K/UL (ref 164–446)
PMV BLD AUTO: 10.5 FL (ref 9–12.9)
POTASSIUM SERPL-SCNC: 4.4 MMOL/L (ref 3.6–5.5)
RBC # BLD AUTO: 4.79 M/UL (ref 4.7–6.1)
SODIUM SERPL-SCNC: 139 MMOL/L (ref 135–145)
TRIGL SERPL-MCNC: 130 MG/DL (ref 0–149)
WBC # BLD AUTO: 6.2 K/UL (ref 4.8–10.8)

## 2020-04-19 PROCEDURE — 83735 ASSAY OF MAGNESIUM: CPT

## 2020-04-19 PROCEDURE — 80048 BASIC METABOLIC PNL TOTAL CA: CPT

## 2020-04-19 PROCEDURE — 700102 HCHG RX REV CODE 250 W/ 637 OVERRIDE(OP): Performed by: INTERNAL MEDICINE

## 2020-04-19 PROCEDURE — 99232 SBSQ HOSP IP/OBS MODERATE 35: CPT | Mod: 25 | Performed by: INTERNAL MEDICINE

## 2020-04-19 PROCEDURE — 85025 COMPLETE CBC W/AUTO DIFF WBC: CPT

## 2020-04-19 PROCEDURE — 700111 HCHG RX REV CODE 636 W/ 250 OVERRIDE (IP): Performed by: INTERNAL MEDICINE

## 2020-04-19 PROCEDURE — 80061 LIPID PANEL: CPT

## 2020-04-19 PROCEDURE — 99406 BEHAV CHNG SMOKING 3-10 MIN: CPT | Performed by: INTERNAL MEDICINE

## 2020-04-19 PROCEDURE — 770020 HCHG ROOM/CARE - TELE (206)

## 2020-04-19 PROCEDURE — 99232 SBSQ HOSP IP/OBS MODERATE 35: CPT | Performed by: INTERNAL MEDICINE

## 2020-04-19 PROCEDURE — A9270 NON-COVERED ITEM OR SERVICE: HCPCS | Performed by: INTERNAL MEDICINE

## 2020-04-19 PROCEDURE — 36415 COLL VENOUS BLD VENIPUNCTURE: CPT

## 2020-04-19 PROCEDURE — 83036 HEMOGLOBIN GLYCOSYLATED A1C: CPT

## 2020-04-19 RX ORDER — PRASUGREL 10 MG/1
10 TABLET, FILM COATED ORAL DAILY
Qty: 60 TAB | Refills: 2 | Status: SHIPPED | OUTPATIENT
Start: 2020-04-20 | End: 2020-04-20 | Stop reason: SDUPTHER

## 2020-04-19 RX ADMIN — ATORVASTATIN CALCIUM 80 MG: 80 TABLET, FILM COATED ORAL at 16:40

## 2020-04-19 RX ADMIN — ASPIRIN 81 MG: 81 TABLET, COATED ORAL at 05:47

## 2020-04-19 RX ADMIN — PRASUGREL 10 MG: 10 TABLET, FILM COATED ORAL at 05:47

## 2020-04-19 RX ADMIN — METOPROLOL TARTRATE 12.5 MG: 25 TABLET, FILM COATED ORAL at 05:47

## 2020-04-19 RX ADMIN — LISINOPRIL 2.5 MG: 5 TABLET ORAL at 05:48

## 2020-04-19 RX ADMIN — ENOXAPARIN SODIUM 40 MG: 100 INJECTION SUBCUTANEOUS at 16:39

## 2020-04-19 RX ADMIN — METOPROLOL TARTRATE 12.5 MG: 25 TABLET, FILM COATED ORAL at 16:40

## 2020-04-19 ASSESSMENT — FIBROSIS 4 INDEX: FIB4 SCORE: 3.67

## 2020-04-19 ASSESSMENT — ENCOUNTER SYMPTOMS
NERVOUS/ANXIOUS: 0
BRUISES/BLEEDS EASILY: 0
GASTROINTESTINAL NEGATIVE: 1
RESPIRATORY NEGATIVE: 1
SHORTNESS OF BREATH: 0
MUSCULOSKELETAL NEGATIVE: 1
CONSTITUTIONAL NEGATIVE: 1
CARDIOVASCULAR NEGATIVE: 1
NEUROLOGICAL NEGATIVE: 1
MYALGIAS: 0
COUGH: 0
PSYCHIATRIC NEGATIVE: 1
PALPITATIONS: 0
WEAKNESS: 0
NAUSEA: 0
ABDOMINAL PAIN: 0
DIZZINESS: 0
VOMITING: 0
EYES NEGATIVE: 1
CHILLS: 0
FEVER: 0
HEADACHES: 0

## 2020-04-19 NOTE — PROGRESS NOTES
12 hour chart check   

## 2020-04-19 NOTE — PROGRESS NOTES
Hospital Medicine Daily Progress Note    Date of Service  4/19/2020    Chief Complaint  45 y.o. male admitted 4/18/2020 with chest pain     Hospital Course    *45-year-old male with no significant past medical history presented 4/18 with chest pain, the patient does not have diabetes or hypertension no family history of coronary artery disease, he developed severe pressure chest pain, on admission EKG showed ST elevation in the inferior leads, the patient was transfer to cardiac cath and a stent was placed on the RCA, the patient has been doing better after cardiac cath, no significant chest pain, echo after the cardiac cath showed normal ejection fraction 55% with inferior wall segment hypokinesis. We will continue aspirin and added Effient and continue atorvastatin*      Interval Problem Update  -Evaluated examined the patient at bedside, no significant chest pain, telemetry no arrhythmia.   -The case was discussed with cardiologist continue monitoring for another day.  -Continue aspirin with Effient and atorvastatin, no need for Lasix.     Consultants/Specialty  Cardiologist    Code Status  Full code    Disposition  Home tomorrow and follow-up with cardiac rehab    Review of Systems  Review of Systems   Constitutional: Negative.    HENT: Negative.    Respiratory: Negative.    Cardiovascular: Negative.    Gastrointestinal: Negative.    Genitourinary: Negative.    Musculoskeletal: Negative.    Neurological: Negative.    Psychiatric/Behavioral: Negative.         Physical Exam  Temp:  [36.2 °C (97.1 °F)-37.2 °C (98.9 °F)] 37 °C (98.6 °F)  Pulse:  [54-85] 54  Resp:  [14-19] 16  BP: (112-128)/(69-84) 117/74  SpO2:  [98 %-100 %] 100 %    Physical Exam  Constitutional:       Appearance: He is not ill-appearing.   Cardiovascular:      Rate and Rhythm: Normal rate.      Heart sounds: No murmur.   Pulmonary:      Effort: Pulmonary effort is normal. No respiratory distress.      Breath sounds: No wheezing or rales.    Abdominal:      General: Abdomen is flat. There is no distension.      Tenderness: There is no abdominal tenderness. There is no guarding.   Neurological:      Mental Status: He is alert.         Fluids  No intake or output data in the 24 hours ending 04/19/20 0817    Laboratory  Recent Labs     04/18/20 0424 04/19/20  0350   WBC 10.5 6.2   RBC 5.26 4.79   HEMOGLOBIN 15.0 13.9*   HEMATOCRIT 43.6 40.2*   MCV 82.9 83.9   MCH 28.5 29.0   MCHC 34.4 34.6   RDW 46.9 46.3   PLATELETCT 193 139*   MPV 11.2 10.5     Recent Labs     04/18/20  0424 04/19/20  0350   SODIUM 139 139   POTASSIUM 3.2* 4.4   CHLORIDE 98 105   CO2 24 24   GLUCOSE 145* 101*   BUN 12 10   CREATININE 1.40 1.06   CALCIUM 9.1 8.6     Recent Labs     04/18/20 0424   APTT 25.1   INR 1.01         Recent Labs     04/19/20  0350   TRIGLYCERIDE 130   HDL 37*   LDL 65       Imaging  EC-ECHOCARDIOGRAM COMPLETE W/O CONT   Final Result      DX-CHEST-PORTABLE (1 VIEW)   Final Result      Upper normal heart size with no consolidation identified      CL-LEFT HEART CATHETERIZATION WITH POSSIBLE INTERVENTION    (Results Pending)        Assessment/Plan  STEMI (ST elevation myocardial infarction) (HCC)- (present on admission)  Assessment & Plan  Patient will undergo emergent cardiac catheterization  Admit to the telemetry unit with close cardiac monitoring, serial EKG and troponin  Patient has been given full dose of aspirin and is started on IV heparin, monitor APTT  Hold beta-blockers as blood pressures currently marginal  I will start the patient on atorvastatin 40 mg daily  Check 2D echo, lipid panel, TSH and hemoglobin A1c  Nitro and morphine when necessary for chest pain        Hypokalemia- (present on admission)  Assessment & Plan  Replace with oral K. Dur  Monitor BMP      Tobacco abuse- (present on admission)  Assessment & Plan  Tobacco cessation education provided for more than 11 minutes.  We discussed the risks of smoking including increased risk of heart  disease, stroke, cancer and COPD. We discussed the benefits of quitting smoking. We discussed options of nicotine patch, wellbutrin and chantix.  The patient has the intention to quit smoking.  Avoid nicotine replacement at this time         VTE prophylaxis: lovenox

## 2020-04-19 NOTE — PROGRESS NOTES
Cardiology Follow Up Progress Note    Date of Service  4/19/2020    Attending Physician  Ricardo Chang M.D.    Chief Complaint   STEMI, inferior myocardial infarction, coronary artery disease status post stent placement.    STEVIE Borges is a 45 y.o. male admitted 4/18/2020 with above.    Interim Events  No significant changes noted from cardiac standpoint within the past 24 hours.    Review of Systems  Review of Systems   Constitutional: Negative.  Negative for chills and fever.   HENT: Negative.  Negative for hearing loss.    Eyes: Negative.    Respiratory: Negative.  Negative for cough and shortness of breath.    Cardiovascular: Negative.  Negative for chest pain, palpitations and leg swelling.   Gastrointestinal: Negative.  Negative for abdominal pain, nausea and vomiting.   Genitourinary: Negative.  Negative for dysuria and urgency.   Musculoskeletal: Negative.  Negative for myalgias.   Skin: Negative.  Negative for rash.   Neurological: Negative.  Negative for dizziness, weakness and headaches.   Hematological: Does not bruise/bleed easily.   Psychiatric/Behavioral: Negative.  The patient is not nervous/anxious.        Vital signs in last 24 hours  Temp:  [36.2 °C (97.1 °F)-37.1 °C (98.7 °F)] 37 °C (98.6 °F)  Pulse:  [54-71] 71  Resp:  [16-19] 17  BP: (112-124)/(69-84) 119/78  SpO2:  [98 %-100 %] 98 %    Physical Exam  Physical Exam  Constitutional:       Appearance: He is well-developed.   HENT:      Head: Normocephalic and atraumatic.   Eyes:      Conjunctiva/sclera: Conjunctivae normal.      Pupils: Pupils are equal, round, and reactive to light.   Neck:      Musculoskeletal: Normal range of motion and neck supple.   Cardiovascular:      Rate and Rhythm: Normal rate and regular rhythm.   Pulmonary:      Effort: Pulmonary effort is normal.      Breath sounds: Normal breath sounds.   Abdominal:      General: Bowel sounds are normal.      Palpations: Abdomen is soft.   Musculoskeletal: Normal range of  motion.   Skin:     General: Skin is warm and dry.   Neurological:      Mental Status: He is alert and oriented to person, place, and time.         Lab Review  Lab Results   Component Value Date/Time    WBC 6.2 04/19/2020 03:50 AM    RBC 4.79 04/19/2020 03:50 AM    HEMOGLOBIN 13.9 (L) 04/19/2020 03:50 AM    HEMATOCRIT 40.2 (L) 04/19/2020 03:50 AM    MCV 83.9 04/19/2020 03:50 AM    MCH 29.0 04/19/2020 03:50 AM    MCHC 34.6 04/19/2020 03:50 AM    MPV 10.5 04/19/2020 03:50 AM      Lab Results   Component Value Date/Time    SODIUM 139 04/19/2020 03:50 AM    POTASSIUM 4.4 04/19/2020 03:50 AM    CHLORIDE 105 04/19/2020 03:50 AM    CO2 24 04/19/2020 03:50 AM    GLUCOSE 101 (H) 04/19/2020 03:50 AM    BUN 10 04/19/2020 03:50 AM    CREATININE 1.06 04/19/2020 03:50 AM      Lab Results   Component Value Date/Time    ASTSGOT 47 (H) 04/18/2020 04:24 AM    ALTSGPT 18 04/18/2020 04:24 AM     Lab Results   Component Value Date/Time    CHOLSTRLTOT 128 04/19/2020 03:50 AM    LDL 65 04/19/2020 03:50 AM    HDL 37 (A) 04/19/2020 03:50 AM    TRIGLYCERIDE 130 04/19/2020 03:50 AM    TROPONINT 402 (H) 04/18/2020 04:24 AM       Recent Labs     04/18/20  0424   NTPROBNP 1392*   (Above labs reviewed.)       Current Facility-Administered Medications:   •  enoxaparin (LOVENOX) inj 40 mg, 40 mg, Subcutaneous, DAILY, Ricardo Chang M.D.  •  senna-docusate (PERICOLACE or SENOKOT S) 8.6-50 MG per tablet 2 Tab, 2 Tab, Oral, BID **AND** polyethylene glycol/lytes (MIRALAX) PACKET 1 Packet, 1 Packet, Oral, QDAY PRN **AND** magnesium hydroxide (MILK OF MAGNESIA) suspension 30 mL, 30 mL, Oral, QDAY PRN **AND** bisacodyl (DULCOLAX) suppository 10 mg, 10 mg, Rectal, QDAY PRN, Chan Alarcon M.D.  •  Respiratory Therapy Consult, , Nebulization, Continuous RT, Chan Alarcon M.D.  •  acetaminophen (TYLENOL) tablet 650 mg, 650 mg, Oral, Q6HRS PRN, Chan Alarcon M.D.  •  aspirin EC (ECOTRIN) tablet 81 mg, 81 mg, Oral, DAILY, Chan Alarcon M.D., 81 mg at 04/19/20  0547  •  atorvastatin (LIPITOR) tablet 80 mg, 80 mg, Oral, Q EVENING, Chan Alarcon M.D., 80 mg at 04/18/20 1757  •  nitroglycerin (NITROSTAT) tablet 0.4 mg, 0.4 mg, Sublingual, Q5 MIN PRN, Chan Alarcon M.D.  •  morphine (pf) 4 MG/ML injection 2-4 mg, 2-4 mg, Intravenous, Q5 MIN PRN, Chan Alarcon M.D.  •  ondansetron (ZOFRAN) syringe/vial injection 4 mg, 4 mg, Intravenous, Q4HRS PRN, Chan Alarcon M.D.  •  ondansetron (ZOFRAN ODT) dispertab 4 mg, 4 mg, Oral, Q4HRS PRN, Chan Alarcon M.D.  •  lisinopril (PRINIVIL) tablet 2.5 mg, 2.5 mg, Oral, Q DAY, Ricardo Chang M.D., 2.5 mg at 04/19/20 0548  •  metoprolol (LOPRESSOR) tablet 12.5 mg, 12.5 mg, Oral, TWICE DAILY, Ricardo Chang M.D., 12.5 mg at 04/19/20 0547  •  prasugrel (EFFIENT) tablet 10 mg, 10 mg, Oral, DAILY, Ricardo Chang M.D., 10 mg at 04/19/20 0547  •  hydrOXYzine HCl (ATARAX) tablet 10 mg, 10 mg, Oral, TID PRN, Ricardo Chang M.D., 10 mg at 04/18/20 1800  (Medications reviewed.)    Cardiac Imaging and Procedures Review  CARDIAC STUDIES/PROCEDURES:    CARDIAC CATHETERIZATION CONCLUSIONS (04/18/20)  1.  Single-vessel coronary artery disease with high-grade proximal right   coronary artery stenosis.    2.  Successful percutaneous transluminal coronary angioplasty/stent placement   of the proximal right coronary artery with 3.0x22 mm Calion drug-eluting stent.   3.  Reduced left ventricular systolic function with ejection fraction of 45%.   4.  Elevated left ventricular end-diastolic pressure.    (study result reviewed)    ECHOCARDIOGRAM CONCLUSIONS (04/18/20)  No prior study is available for comparison.   Normal left ventricular chamber size.  Mild concentric left ventricular hypertrophy.  Left ventricular systolic function is low normal.  Left ventricular ejection fraction is visually estimated to be 55%.  Inferior wall segment appears hypokinetic.  (study result reviewed)    EKG performed on (04/18/20) was reviewed: EKG personally interpreted  shows sinus rhythm with minimalanterior ST elevation and T wave inversions.  EKG performed on (04/18/20) EKG shows sinus rhythm with 3-4 mm ST segment elevation of the inferior leads.    Assessment/Plan  1. STEMI, inferior myocardial infarction, coronary artery disease with stent placement: He is clinically doing well without recurrence of his angina.  We will continue with current medical care including aspirin, prasugrel, metoprolol, lisinopril and atorvastatin.  2. Hypertension: Blood pressure is well controlled. We will continue with beta blockade therapy and ace inhibitor therapy.  3. Hyperlipidemia: He is doing well on statin therapy without myalgia symptoms.  4. Chronic tobacco use: Smoking cessation recommended with discussions of health effects and plans for over 3 minutes.    Thank you for allowing me to participate in the care of this patient.  I will continue to follow this patient    Please contact me with any questions.    Ifeanyi Contreras M.D.   Cardiologist, [facility]  [phone]

## 2020-04-20 ENCOUNTER — PATIENT OUTREACH (OUTPATIENT)
Dept: HEALTH INFORMATION MANAGEMENT | Facility: OTHER | Age: 45
End: 2020-04-20

## 2020-04-20 VITALS
HEIGHT: 66 IN | HEART RATE: 62 BPM | WEIGHT: 165.12 LBS | TEMPERATURE: 98.6 F | OXYGEN SATURATION: 99 % | BODY MASS INDEX: 26.54 KG/M2 | DIASTOLIC BLOOD PRESSURE: 107 MMHG | RESPIRATION RATE: 16 BRPM | SYSTOLIC BLOOD PRESSURE: 161 MMHG

## 2020-04-20 DIAGNOSIS — I21.11 ST ELEVATION MYOCARDIAL INFARCTION INVOLVING RIGHT CORONARY ARTERY (HCC): ICD-10-CM

## 2020-04-20 PROBLEM — I25.10 CORONARY ARTERY DISEASE: Status: ACTIVE | Noted: 2020-04-20

## 2020-04-20 PROBLEM — E87.6 HYPOKALEMIA: Status: RESOLVED | Noted: 2020-04-18 | Resolved: 2020-04-20

## 2020-04-20 PROBLEM — I21.3 STEMI (ST ELEVATION MYOCARDIAL INFARCTION) (HCC): Status: RESOLVED | Noted: 2020-04-18 | Resolved: 2020-04-20

## 2020-04-20 LAB
ANION GAP SERPL CALC-SCNC: 13 MMOL/L (ref 7–16)
BASOPHILS # BLD AUTO: 0.5 % (ref 0–1.8)
BASOPHILS # BLD: 0.03 K/UL (ref 0–0.12)
BUN SERPL-MCNC: 14 MG/DL (ref 8–22)
CALCIUM SERPL-MCNC: 9 MG/DL (ref 8.5–10.5)
CHLORIDE SERPL-SCNC: 103 MMOL/L (ref 96–112)
CO2 SERPL-SCNC: 22 MMOL/L (ref 20–33)
CREAT SERPL-MCNC: 0.99 MG/DL (ref 0.5–1.4)
EOSINOPHIL # BLD AUTO: 0.03 K/UL (ref 0–0.51)
EOSINOPHIL NFR BLD: 0.5 % (ref 0–6.9)
ERYTHROCYTE [DISTWIDTH] IN BLOOD BY AUTOMATED COUNT: 46.8 FL (ref 35.9–50)
GLUCOSE SERPL-MCNC: 108 MG/DL (ref 65–99)
HCT VFR BLD AUTO: 42.5 % (ref 42–52)
HGB BLD-MCNC: 14 G/DL (ref 14–18)
IMM GRANULOCYTES # BLD AUTO: 0.01 K/UL (ref 0–0.11)
IMM GRANULOCYTES NFR BLD AUTO: 0.2 % (ref 0–0.9)
LYMPHOCYTES # BLD AUTO: 2.47 K/UL (ref 1–4.8)
LYMPHOCYTES NFR BLD: 41.4 % (ref 22–41)
MAGNESIUM SERPL-MCNC: 1.8 MG/DL (ref 1.5–2.5)
MCH RBC QN AUTO: 27.9 PG (ref 27–33)
MCHC RBC AUTO-ENTMCNC: 32.9 G/DL (ref 33.7–35.3)
MCV RBC AUTO: 84.8 FL (ref 81.4–97.8)
MONOCYTES # BLD AUTO: 0.44 K/UL (ref 0–0.85)
MONOCYTES NFR BLD AUTO: 7.4 % (ref 0–13.4)
NEUTROPHILS # BLD AUTO: 2.98 K/UL (ref 1.82–7.42)
NEUTROPHILS NFR BLD: 50 % (ref 44–72)
NRBC # BLD AUTO: 0 K/UL
NRBC BLD-RTO: 0 /100 WBC
PLATELET # BLD AUTO: 151 K/UL (ref 164–446)
PMV BLD AUTO: 10.9 FL (ref 9–12.9)
POTASSIUM SERPL-SCNC: 3.9 MMOL/L (ref 3.6–5.5)
RBC # BLD AUTO: 5.01 M/UL (ref 4.7–6.1)
SODIUM SERPL-SCNC: 138 MMOL/L (ref 135–145)
WBC # BLD AUTO: 6 K/UL (ref 4.8–10.8)

## 2020-04-20 PROCEDURE — 85025 COMPLETE CBC W/AUTO DIFF WBC: CPT

## 2020-04-20 PROCEDURE — 99239 HOSP IP/OBS DSCHRG MGMT >30: CPT | Performed by: INTERNAL MEDICINE

## 2020-04-20 PROCEDURE — A9270 NON-COVERED ITEM OR SERVICE: HCPCS | Performed by: INTERNAL MEDICINE

## 2020-04-20 PROCEDURE — 700102 HCHG RX REV CODE 250 W/ 637 OVERRIDE(OP): Performed by: INTERNAL MEDICINE

## 2020-04-20 PROCEDURE — 83735 ASSAY OF MAGNESIUM: CPT

## 2020-04-20 PROCEDURE — 99233 SBSQ HOSP IP/OBS HIGH 50: CPT | Performed by: INTERNAL MEDICINE

## 2020-04-20 PROCEDURE — A9270 NON-COVERED ITEM OR SERVICE: HCPCS | Performed by: NURSE PRACTITIONER

## 2020-04-20 PROCEDURE — 36415 COLL VENOUS BLD VENIPUNCTURE: CPT

## 2020-04-20 PROCEDURE — 700102 HCHG RX REV CODE 250 W/ 637 OVERRIDE(OP): Performed by: NURSE PRACTITIONER

## 2020-04-20 PROCEDURE — 700111 HCHG RX REV CODE 636 W/ 250 OVERRIDE (IP): Performed by: INTERNAL MEDICINE

## 2020-04-20 PROCEDURE — 80048 BASIC METABOLIC PNL TOTAL CA: CPT

## 2020-04-20 RX ORDER — LISINOPRIL 5 MG/1
5 TABLET ORAL
Status: DISCONTINUED | OUTPATIENT
Start: 2020-04-21 | End: 2020-04-20

## 2020-04-20 RX ORDER — LISINOPRIL 5 MG/1
10 TABLET ORAL
Status: DISCONTINUED | OUTPATIENT
Start: 2020-04-21 | End: 2020-04-20 | Stop reason: HOSPADM

## 2020-04-20 RX ORDER — LISINOPRIL 10 MG/1
10 TABLET ORAL DAILY
Qty: 30 TAB | Refills: 2 | Status: SHIPPED | OUTPATIENT
Start: 2020-04-21 | End: 2020-04-30 | Stop reason: SDUPTHER

## 2020-04-20 RX ORDER — PRASUGREL 10 MG/1
10 TABLET, FILM COATED ORAL DAILY
Qty: 30 TAB | Refills: 11 | Status: SHIPPED | OUTPATIENT
Start: 2020-04-20 | End: 2020-04-20 | Stop reason: SDUPTHER

## 2020-04-20 RX ORDER — LISINOPRIL 5 MG/1
2.5 TABLET ORAL ONCE
Status: COMPLETED | OUTPATIENT
Start: 2020-04-20 | End: 2020-04-20

## 2020-04-20 RX ORDER — PRASUGREL 10 MG/1
10 TABLET, FILM COATED ORAL DAILY
Qty: 30 TAB | Refills: 3 | Status: SHIPPED | OUTPATIENT
Start: 2020-04-20 | End: 2020-04-30 | Stop reason: SDUPTHER

## 2020-04-20 RX ORDER — LISINOPRIL 5 MG/1
5 TABLET ORAL ONCE
Status: COMPLETED | OUTPATIENT
Start: 2020-04-20 | End: 2020-04-20

## 2020-04-20 RX ORDER — ATORVASTATIN CALCIUM 80 MG/1
80 TABLET, FILM COATED ORAL EVERY EVENING
Qty: 30 TAB | Refills: 2 | Status: SHIPPED | OUTPATIENT
Start: 2020-04-20 | End: 2020-04-30 | Stop reason: SDUPTHER

## 2020-04-20 RX ORDER — ASPIRIN 81 MG/1
81 TABLET ORAL DAILY
Qty: 60 TAB | Refills: 3 | Status: SHIPPED | OUTPATIENT
Start: 2020-04-21 | End: 2020-04-30 | Stop reason: SDUPTHER

## 2020-04-20 RX ORDER — ACETAMINOPHEN 325 MG/1
650 TABLET ORAL EVERY 6 HOURS PRN
Qty: 30 TAB | Refills: 0 | Status: SHIPPED
Start: 2020-04-20 | End: 2020-04-30

## 2020-04-20 RX ORDER — NITROGLYCERIN 0.4 MG/1
0.4 TABLET SUBLINGUAL PRN
Qty: 25 TAB | Refills: 1 | Status: ON HOLD | OUTPATIENT
Start: 2020-04-20 | End: 2021-01-23 | Stop reason: SDUPTHER

## 2020-04-20 RX ADMIN — METOPROLOL TARTRATE 12.5 MG: 25 TABLET, FILM COATED ORAL at 16:01

## 2020-04-20 RX ADMIN — PRASUGREL 10 MG: 10 TABLET, FILM COATED ORAL at 06:35

## 2020-04-20 RX ADMIN — METOPROLOL TARTRATE 12.5 MG: 25 TABLET, FILM COATED ORAL at 06:35

## 2020-04-20 RX ADMIN — ATORVASTATIN CALCIUM 80 MG: 80 TABLET, FILM COATED ORAL at 16:01

## 2020-04-20 RX ADMIN — LISINOPRIL 2.5 MG: 5 TABLET ORAL at 06:35

## 2020-04-20 RX ADMIN — ENOXAPARIN SODIUM 40 MG: 100 INJECTION SUBCUTANEOUS at 06:35

## 2020-04-20 RX ADMIN — ASPIRIN 81 MG: 81 TABLET, COATED ORAL at 06:35

## 2020-04-20 RX ADMIN — LISINOPRIL 5 MG: 5 TABLET ORAL at 12:13

## 2020-04-20 RX ADMIN — LISINOPRIL 2.5 MG: 5 TABLET ORAL at 07:59

## 2020-04-20 ASSESSMENT — ENCOUNTER SYMPTOMS
DIZZINESS: 0
CHEST TIGHTNESS: 0
ABDOMINAL PAIN: 0
FEVER: 0
SHORTNESS OF BREATH: 0
BLOOD IN STOOL: 0
PALPITATIONS: 0

## 2020-04-20 NOTE — DISCHARGE PLANNING
[facility]    Received request for [facility] Meds-to-Beds. Pt uninsured and unable to afford cash copay of $48.25 for prasugrel. LSW provided pt with GoodRX coupons for medications to  at [facility], which pt is able to afford.    Gwen Hicks, BobD

## 2020-04-20 NOTE — PROGRESS NOTES
Spoke with Meds to Bed and patient cannot afford the copay d/t no insurance. Patient was given [employer] coupons and med was sent to [facility] for pickup.

## 2020-04-20 NOTE — PROGRESS NOTES
Cardiology Follow Up Progress Note    Date of Service  4/20/2020    Attending Physician  Ricardo Chang M.D.    Chief Complaint   Chest pain.     STEVIE Borges is a 45 y.o. male admitted 4/18/2020 with acute inferior STEMI. Emergent cardiac catheterization showed single vessel disease with a proximal occlusion of the RCA, he underwent successful PCI with BRIAN.     Past medical history significant for tobacco and previous ETOH use    Interim Events  4/20/20: BP elevated this am, lisinopril increased. Denies any concerns or complaints this am. Ambulating hallway without difficulty. Denies chest pain, SOB or palpitations. SB/SR 55-73 on tele with rare PVC's. Anxious to be discharged.     Review of Systems  Review of Systems   Constitutional: Negative for fever.   Respiratory: Negative for chest tightness and shortness of breath.    Cardiovascular: Negative for chest pain, palpitations and leg swelling.   Gastrointestinal: Negative for abdominal pain and blood in stool.   Genitourinary: Negative for hematuria.   Musculoskeletal: Negative for gait problem.   Neurological: Negative for dizziness and syncope.   All other systems reviewed and are negative.      Vital signs in last 24 hours  Temp:  [36.7 °C (98.1 °F)-37.3 °C (99.2 °F)] 36.7 °C (98.1 °F)  Pulse:  [56-88] 56  Resp:  [16-17] 16  BP: (119-164)/(78-97) 164/97  SpO2:  [97 %-99 %] 99 %    Physical Exam  Physical Exam  Constitutional:       General: He is not in acute distress.     Appearance: Normal appearance.   HENT:      Head: Normocephalic.   Eyes:      Extraocular Movements: Extraocular movements intact.   Neck:      Musculoskeletal: Normal range of motion.   Cardiovascular:      Rate and Rhythm: Normal rate and regular rhythm.      Pulses: Normal pulses.      Heart sounds: Normal heart sounds. No murmur.   Pulmonary:      Effort: Pulmonary effort is normal.      Breath sounds: Normal breath sounds.   Abdominal:      General: There is no distension.       Palpations: Abdomen is soft.      Tenderness: There is no abdominal tenderness.   Musculoskeletal:      Right lower leg: No edema.      Left lower leg: No edema.   Skin:     General: Skin is warm and dry.      Comments: Right radial cath site is uncomplicated.    Neurological:      Mental Status: He is alert and oriented to person, place, and time.   Psychiatric:         Mood and Affect: Mood normal.         Thought Content: Thought content normal.         Judgment: Judgment normal.         Lab Review  Lab Results   Component Value Date/Time    WBC 6.0 04/20/2020 03:02 AM    RBC 5.01 04/20/2020 03:02 AM    HEMOGLOBIN 14.0 04/20/2020 03:02 AM    HEMATOCRIT 42.5 04/20/2020 03:02 AM    MCV 84.8 04/20/2020 03:02 AM    MCH 27.9 04/20/2020 03:02 AM    MCHC 32.9 (L) 04/20/2020 03:02 AM    MPV 10.9 04/20/2020 03:02 AM      Lab Results   Component Value Date/Time    SODIUM 138 04/20/2020 03:02 AM    POTASSIUM 3.9 04/20/2020 03:02 AM    CHLORIDE 103 04/20/2020 03:02 AM    CO2 22 04/20/2020 03:02 AM    GLUCOSE 108 (H) 04/20/2020 03:02 AM    BUN 14 04/20/2020 03:02 AM    CREATININE 0.99 04/20/2020 03:02 AM      Lab Results   Component Value Date/Time    ASTSGOT 47 (H) 04/18/2020 04:24 AM    ALTSGPT 18 04/18/2020 04:24 AM     Lab Results   Component Value Date/Time    CHOLSTRLTOT 128 04/19/2020 03:50 AM    LDL 65 04/19/2020 03:50 AM    HDL 37 (A) 04/19/2020 03:50 AM    TRIGLYCERIDE 130 04/19/2020 03:50 AM    TROPONINT 402 (H) 04/18/2020 04:24 AM       Recent Labs     04/18/20  0424   NTPROBNP 1392*       Cardiac Imaging and Procedures Review  Cardiac Catheterization 4/18/2020:    ANGIOGRAM:    1.  Left main coronary artery:  Left main coronary artery is a short, moderate to large-caliber vessel free of disease.    2.  Left anterior descending artery:  Left anterior descending artery is a long large-caliber vessel with mild tortuosity wrapping the apex.  There are 2 small-caliber bifurcating diagonal branches noted.  Left  anterior descending artery and its branches are free of disease.    3.  Ramus intermedius:  Ramus intermedius is a long large-caliber bifurcating vessel free of disease.    4.  Left circumflex artery:  Left circumflex artery is a nondominant moderate-caliber vessel with very small obtuse marginal branches x3.  Left circumflex artery and its branches are free of disease.    5.  Right coronary artery:  Right coronary artery is a dominant large-caliber vessel, which is occluded at the proximal portion.  Distally, there is a moderate-caliber posterior descending        IMPRESSION:    1.  Single-vessel coronary artery disease with high-grade proximal right coronary artery stenosis.    2.  Successful percutaneous transluminal coronary angioplasty/stent placement of the proximal right coronary artery with 3.0x22 mm Oxford drug-eluting stent.   3.  Reduced left ventricular systolic function with ejection fraction of 45%.   4.  Elevated left ventricular end-diastolic pressure.       Echocardiogram 4/18/20:  CONCLUSIONS  No prior study is available for comparison.   Normal left ventricular chamber size.  Mild concentric left ventricular hypertrophy.  Left ventricular systolic function is low normal.  Left ventricular ejection fraction is visually estimated to be 55%.  Inferior wall segment appears hypokinetic.       Assessment/Plan  Acute Inferior STEMI:  -LCH showed single vessel CAD with prosimal occlusion of the RCA, S/P stent placement.  -TTE showed LVEF of 55% and inferior wall hypokinesis.   -No significant ectopy on tele.   -LDL 65, Atorvastatin 80 mg daily started this admission.   -DAPT (Effient 10 mg and ASA 81) for minimum of one year, meds to bed requested.   -Continue Lopressor 12.5 mg BID.   -Lisinopril increased to 10 mg daily.   -Referral placed to [facility].     HTN:  -Elevated this am, lisinopril increased as above.     Tobacco Use:  -Reinforced previous discussions regarding the importance of smoking cessation.      Reviewed discharge instructions with patient related to lifting precautions with right radial site for one week. Patient was given thorough discussion of his discharge instructions per nursing and myself. DAPT and statin adherence was discussed in detail. Patient was discharged home with family with no further questions/concerns, their outpatient follow up has been arranged by our office as below.     Thank you for allowing us  to participate in the care of this patient. Close cardiology follow up has been arranged as below. Patient is stable from out perspective for discharge once BP has stabilized.  We will sign off. Please let us know if you have any questions or concerns.     Future Appointments   Date Time Provider Department Center   4/30/2020  9:15 AM STEVAN Seaman [facility] None       MARILYN Mathias.   [facility]  [phone]

## 2020-04-20 NOTE — DISCHARGE INSTRUCTIONS
Discharge Instructions    Discharged to home by car with relative. Discharged via wheelchair, hospital escort: Yes.  Special equipment needed: Not Applicable    Be sure to schedule a follow-up appointment with your primary care doctor or any specialists as instructed.     Discharge Plan:   Smoking Cessation Offered: Patient Counseled  Influenza Vaccine Indication: Patient Refuses    I understand that a diet low in cholesterol, fat, and sodium is recommended for good health. Unless I have been given specific instructions below for another diet, I accept this instruction as my diet prescription.   Other diet: low sodium    Special Instructions: Diagnosis:  Acute Coronary Syndrome (ACS) is a diagnosis that encompasses cardiac-related chest pain and heart attack. ACS occurs when the blood flow to the heart muscle is severely reduced or cut off completely due to a slow process called atherosclerosis.  Atherosclerosis is a disease in which the coronary arteries become narrow from a buildup of fat, cholesterol, and other substances that combine to form plaque. If the plaque breaks, a blood clot will form and block the blood flow to the heart muscle. This lack of blood flow can cause damage or death to the heart muscle which is called a heart attack or Myocardial Infarction (MI). There are two different types of MIs:  ST Elevation Myocardial Infarction or STEMI (the most severe type of heart attack) and Non-ST Elevation Myocardial Infarction or NSTEMI.    Treatment Plan:  · Cardiac Diet  - Low fat, low salt, low cholesterol   · Cardiac Rehab  - Your doctor has ordered you a referral to [facility].  Call [phone] to schedule an appointment.  · Attend my follow-up appointment with my Cardiologist.  · Take my medications as prescribed by my doctor  · Exercise daily  · Quit Smoking, Lower my bad cholesterol and raise my good cholesterol, lower my blood pressure and Reduce stress    Medications:  Certain medications are used  to treat ACS.  Remember to always take medications as prescribed and never stop talking medications unless told by your doctor.    You have been prescribed the following medicatons:    Aspirin - Aspirin is used as a blood thinning medication and you will require this medication indefinitely.  Anti-platelet/blood thinner - Your Anti-platelet/Blood thinning medication is called effient, and is used in combination with aspirin to prevent clots from forming in your heart and/or around your stent.  Your doctor will determine how long you need to be on this medicine.  Beta-Blocker - Beta-Blocker metoprolol is used to lower blood pressure and heart rate, and/or helps your heart heal after a heart attack.  Statin - Statin lipitor is used to lower cholesterol.  Angiotensin Converting Enzyme (ACE) Inhibitor - Angiotensin Converting Enzyme Inhibitor lisinopril is used to lower blood pressure and treat heart failure.  Nitroglycerine - Nitroglycerine is used to relieve chest pain.    · Is patient discharged on Warfarin / Coumadin?   No   Prasugrel oral tablets  What is this medicine?  PRASUGREL (PRA anthony grel) helps to prevent blood clots. This medicine is used to prevent heart attack, stroke, or other vascular events in people who are at high risk.  This medicine may be used for other purposes; ask your health care provider or pharmacist if you have questions.  COMMON BRAND NAME(S): Effient  What should I tell my health care provider before I take this medicine?  They need to know if you have any of these conditions:  -bleeding disorders  -kidney disease  -liver disease  -recent trauma or surgery  -stomach or intestinal ulcers  -stroke or transient ischemic attack  -an unusual or allergic reaction to prasugrel, other medicines, foods, dyes, or preservatives  -pregnant or trying to get pregnant  -breast-feeding  How should I use this medicine?  Take this medicine by mouth with a drink of water. Follow the directions on the  prescription label. You may take this medicine with or without food. If it upsets your stomach, take it with food. This medicine may be chewed or it may be crushed and put into food or liquids such as applesauce, juice, or water as long as it is taken immediately. This medicine has a bitter taste that you may notice if it is chewed or crushed. Take your medicine at regular intervals. Do not take your medicine more often than directed. Do not stop taking except on your doctor's advice.  Talk to your pediatrician regarding the use of this medicine in children. Special care may be needed.  Overdosage: If you think you have taken too much of this medicine contact a poison control center or emergency room at once.  NOTE: This medicine is only for you. Do not share this medicine with others.  What if I miss a dose?  If you miss a dose, take it as soon as you can. If it is almost time for your next dose, take only that dose. Do not take double or extra doses.  What may interact with this medicine?  -aspirin  -certain medicines that treat or prevent blood clots like warfarin, enoxaparin, and dalteparin  -NSAIDS, medicines for pain and inflammation, like ibuprofen or naproxen  This list may not describe all possible interactions. Give your health care provider a list of all the medicines, herbs, non-prescription drugs, or dietary supplements you use. Also tell them if you smoke, drink alcohol, or use illegal drugs. Some items may interact with your medicine.  What should I watch for while using this medicine?  Visit your doctor or health care professional for regular check ups. Do not stop taking your medicine unless your doctor tells you to.  Notify your doctor or health care professional and seek emergency treatment if you develop breathing problems; changes in vision; chest pain; severe, sudden headache; pain, swelling, warmth in the leg; trouble speaking; sudden numbness or weakness of the face, arm, or leg. These can be  signs that your condition has gotten worse.  If you are going to have surgery or dental work, tell your doctor or health care professional that you are taking this medicine.  What side effects may I notice from receiving this medicine?  Side effects that you should report to your doctor or health care professional as soon as possible:  -allergic reactions like skin rash, itching or hives, swelling of the face, lips, or tongue  -signs and symptoms of bleeding such as bloody or black, tarry stools; red or dark-brown urine; spitting up blood or brown material that looks like coffee grounds; red spots on the skin; unusual bruising or bleeding from the eye, gums, or nose  Side effects that usually do not require medical attention (report to your doctor or health care professional if they continue or are bothersome):  -diarrhea  -headache  -nausea, vomiting  -pain in back, arms or legs  This list may not describe all possible side effects. Call your doctor for medical advice about side effects. You may report side effects to FDA at 1-922-FDA-2567.  Where should I keep my medicine?  Keep out of the reach of children.  Store at room temperature between 15 and 30 degrees C (59 and 86 degrees F). Keep this medicine in the original container. Keep container closed and do not remove the gray cylinder from the bottle. Throw away any unused medicine after the expiration date.  NOTE: This sheet is a summary. It may not cover all possible information. If you have questions about this medicine, talk to your doctor, pharmacist, or health care provider.  © 2018 [employer] Standard (2016-01-27 10:14:24)  Nitroglycerin sublingual tablets  What is this medicine?  NITROGLYCERIN (myriam troe GLI ser in) is a type of vasodilator. It relaxes blood vessels, increasing the blood and oxygen supply to your heart. This medicine is used to relieve chest pain caused by angina. It is also used to prevent chest pain before activities like climbing  stairs, going outdoors in cold weather, or sexual activity.  This medicine may be used for other purposes; ask your health care provider or pharmacist if you have questions.  COMMON BRAND NAME(S): Nitroquick, Nitrostat, Nitrotab  What should I tell my health care provider before I take this medicine?  They need to know if you have any of these conditions:  -anemia  -head injury, recent stroke, or bleeding in the brain  -liver disease  -previous heart attack  -an unusual or allergic reaction to nitroglycerin, other medicines, foods, dyes, or preservatives  -pregnant or trying to get pregnant  -breast-feeding  How should I use this medicine?  Take this medicine by mouth as needed. At the first sign of an angina attack (chest pain or tightness) place one tablet under your tongue. You can also take this medicine 5 to 10 minutes before an event likely to produce chest pain. Follow the directions on the prescription label. Let the tablet dissolve under the tongue. Do not swallow whole. Replace the dose if you accidentally swallow it. It will help if your mouth is not dry. Saliva around the tablet will help it to dissolve more quickly. Do not eat or drink, smoke or chew tobacco while a tablet is dissolving. If you are not better within 5 minutes after taking ONE dose of nitroglycerin, call 9-1-1 immediately to seek emergency medical care. Do not take more than 3 nitroglycerin tablets over 15 minutes.  If you take this medicine often to relieve symptoms of angina, your doctor or health care professional may provide you with different instructions to manage your symptoms. If symptoms do not go away after following these instructions, it is important to call 9-1-1 immediately. Do not take more than 3 nitroglycerin tablets over 15 minutes.  Talk to your pediatrician regarding the use of this medicine in children. Special care may be needed.  Overdosage: If you think you have taken too much of this medicine contact a poison  control center or emergency room at once.  NOTE: This medicine is only for you. Do not share this medicine with others.  What if I miss a dose?  This does not apply. This medicine is only used as needed.  What may interact with this medicine?  Do not take this medicine with any of the following medications:  -certain migraine medicines like ergotamine and dihydroergotamine (DHE)  -medicines used to treat erectile dysfunction like sildenafil, tadalafil, and vardenafil  -riociguat  This medicine may also interact with the following medications:  -alteplase  -aspirin  -heparin  -medicines for high blood pressure  -medicines for mental depression  -other medicines used to treat angina  -phenothiazines like chlorpromazine, mesoridazine, prochlorperazine, thioridazine  This list may not describe all possible interactions. Give your health care provider a list of all the medicines, herbs, non-prescription drugs, or dietary supplements you use. Also tell them if you smoke, drink alcohol, or use illegal drugs. Some items may interact with your medicine.  What should I watch for while using this medicine?  Tell your doctor or health care professional if you feel your medicine is no longer working.  Keep this medicine with you at all times. Sit or lie down when you take your medicine to prevent falling if you feel dizzy or faint after using it. Try to remain calm. This will help you to feel better faster. If you feel dizzy, take several deep breaths and lie down with your feet propped up, or bend forward with your head resting between your knees.  You may get drowsy or dizzy. Do not drive, use machinery, or do anything that needs mental alertness until you know how this drug affects you. Do not stand or sit up quickly, especially if you are an older patient. This reduces the risk of dizzy or fainting spells. Alcohol can make you more drowsy and dizzy. Avoid alcoholic drinks.  Do not treat yourself for coughs, colds, or pain  while you are taking this medicine without asking your doctor or health care professional for advice. Some ingredients may increase your blood pressure.  What side effects may I notice from receiving this medicine?  Side effects that you should report to your doctor or health care professional as soon as possible:  -blurred vision  -dry mouth  -skin rash  -sweating  -the feeling of extreme pressure in the head  -unusually weak or tired  Side effects that usually do not require medical attention (report to your doctor or health care professional if they continue or are bothersome):  -flushing of the face or neck  -headache  -irregular heartbeat, palpitations  -nausea, vomiting  This list may not describe all possible side effects. Call your doctor for medical advice about side effects. You may report side effects to FDA at 9-194-FDA-5706.  Where should I keep my medicine?  Keep out of the reach of children.  Store at room temperature between 20 and 25 degrees C (68 and 77 degrees F). Store in original container. Protect from light and moisture. Keep tightly closed. Throw away any unused medicine after the expiration date.  NOTE: This sheet is a summary. It may not cover all possible information. If you have questions about this medicine, talk to your doctor, pharmacist, or health care provider.  © 2018 [employer]/Gold Standard (2014-10-16 17:57:36)  Metoprolol tablets  What is this medicine?  METOPROLOL (me TOE proe lole) is a beta-blocker. Beta-blockers reduce the workload on the heart and help it to beat more regularly. This medicine is used to treat high blood pressure and to prevent chest pain. It is also used to after a heart attack and to prevent an additional heart attack from occurring.  This medicine may be used for other purposes; ask your health care provider or pharmacist if you have questions.  COMMON BRAND NAME(S): Lopressor  What should I tell my health care provider before I take this medicine?  They  need to know if you have any of these conditions:  -diabetes  -heart or vessel disease like slow heart rate, worsening heart failure, heart block, sick sinus syndrome or Raynaud's disease  -kidney disease  -liver disease  -lung or breathing disease, like asthma or emphysema  -pheochromocytoma  -thyroid disease  -an unusual or allergic reaction to metoprolol, other beta-blockers, medicines, foods, dyes, or preservatives  -pregnant or trying to get pregnant  -breast-feeding  How should I use this medicine?  Take this medicine by mouth with a drink of water. Follow the directions on the prescription label. Take this medicine immediately after meals. Take your doses at regular intervals. Do not take more medicine than directed. Do not stop taking this medicine suddenly. This could lead to serious heart-related effects.  Talk to your pediatrician regarding the use of this medicine in children. Special care may be needed.  Overdosage: If you think you have taken too much of this medicine contact a poison control center or emergency room at once.  NOTE: This medicine is only for you. Do not share this medicine with others.  What if I miss a dose?  If you miss a dose, take it as soon as you can. If it is almost time for your next dose, take only that dose. Do not take double or extra doses.  What may interact with this medicine?  This medicine may interact with the following medications:  -certain medicines for blood pressure, heart disease, irregular heart beat  -certain medicines for depression like monoamine oxidase (MAO) inhibitors, fluoxetine, or paroxetine  -clonidine  -dobutamine  -epinephrine  -isoproterenol  -reserpine  This list may not describe all possible interactions. Give your health care provider a list of all the medicines, herbs, non-prescription drugs, or dietary supplements you use. Also tell them if you smoke, drink alcohol, or use illegal drugs. Some items may interact with your medicine.  What should  I watch for while using this medicine?  Visit your doctor or health care professional for regular check ups. Contact your doctor right away if your symptoms worsen. Check your blood pressure and pulse rate regularly. Ask your health care professional what your blood pressure and pulse rate should be, and when you should contact them.  You may get drowsy or dizzy. Do not drive, use machinery, or do anything that needs mental alertness until you know how this medicine affects you. Do not sit or stand up quickly, especially if you are an older patient. This reduces the risk of dizzy or fainting spells. Contact your doctor if these symptoms continue. Alcohol may interfere with the effect of this medicine. Avoid alcoholic drinks.  What side effects may I notice from receiving this medicine?  Side effects that you should report to your doctor or health care professional as soon as possible:  -allergic reactions like skin rash, itching or hives  -cold or numb hands or feet  -depression  -difficulty breathing  -faint  -fever with sore throat  -irregular heartbeat, chest pain  -rapid weight gain  -swollen legs or ankles  Side effects that usually do not require medical attention (report to your doctor or health care professional if they continue or are bothersome):  -anxiety or nervousness  -change in sex drive or performance  -dry skin  -headache  -nightmares or trouble sleeping  -short term memory loss  -stomach upset or diarrhea  -unusually tired  This list may not describe all possible side effects. Call your doctor for medical advice about side effects. You may report side effects to FDA at 8-931-FDA-6285.  Where should I keep my medicine?  Keep out of the reach of children.  Store at room temperature between 15 and 30 degrees C (59 and 86 degrees F). Throw away any unused medicine after the expiration date.  NOTE: This sheet is a summary. It may not cover all possible information. If you have questions about this  medicine, talk to your doctor, pharmacist, or health care provider.  © 2018 [employer] Standard (2014-08-22 14:40:36)  Lisinopril tablets  What is this medicine?  LISINOPRIL (lyse IN oh pril) is an ACE inhibitor. This medicine is used to treat high blood pressure and heart failure. It is also used to protect the heart immediately after a heart attack.  This medicine may be used for other purposes; ask your health care provider or pharmacist if you have questions.  COMMON BRAND NAME(S): Prinivil, Zestril  What should I tell my health care provider before I take this medicine?  They need to know if you have any of these conditions:  -diabetes  -heart or blood vessel disease  -kidney disease  -low blood pressure  -previous swelling of the tongue, face, or lips with difficulty breathing, difficulty swallowing, hoarseness, or tightening of the throat  -an unusual or allergic reaction to lisinopril, other ACE inhibitors, insect venom, foods, dyes, or preservatives  -pregnant or trying to get pregnant  -breast-feeding  How should I use this medicine?  Take this medicine by mouth with a glass of water. Follow the directions on your prescription label. You may take this medicine with or without food. If it upsets your stomach, take it with food. Take your medicine at regular intervals. Do not take it more often than directed. Do not stop taking except on your doctor's advice.  Talk to your pediatrician regarding the use of this medicine in children. Special care may be needed. While this drug may be prescribed for children as young as 6 years of age for selected conditions, precautions do apply.  Overdosage: If you think you have taken too much of this medicine contact a poison control center or emergency room at once.  NOTE: This medicine is only for you. Do not share this medicine with others.  What if I miss a dose?  If you miss a dose, take it as soon as you can. If it is almost time for your next dose, take only that  dose. Do not take double or extra doses.  What may interact with this medicine?  Do not take this medicine with any of the following medications:  -hymenoptera venom  -sacubitril; valsartan  This medicines may also interact with the following medications:  -aliskiren  -angiotensin receptor blockers, like losartan or valsartan  -certain medicines for diabetes  -diuretics  -everolimus  -gold compounds  -lithium  -NSAIDs, medicines for pain and inflammation, like ibuprofen or naproxen  -potassium salts or supplements  -salt substitutes  -sirolimus  -temsirolimus  This list may not describe all possible interactions. Give your health care provider a list of all the medicines, herbs, non-prescription drugs, or dietary supplements you use. Also tell them if you smoke, drink alcohol, or use illegal drugs. Some items may interact with your medicine.  What should I watch for while using this medicine?  Visit your doctor or health care professional for regular check ups. Check your blood pressure as directed. Ask your doctor what your blood pressure should be, and when you should contact him or her.  Do not treat yourself for coughs, colds, or pain while you are using this medicine without asking your doctor or health care professional for advice. Some ingredients may increase your blood pressure.  Women should inform their doctor if they wish to become pregnant or think they might be pregnant. There is a potential for serious side effects to an unborn child. Talk to your health care professional or pharmacist for more information.  Check with your doctor or health care professional if you get an attack of severe diarrhea, nausea and vomiting, or if you sweat a lot. The loss of too much body fluid can make it dangerous for you to take this medicine.  You may get drowsy or dizzy. Do not drive, use machinery, or do anything that needs mental alertness until you know how this drug affects you. Do not stand or sit up quickly,  especially if you are an older patient. This reduces the risk of dizzy or fainting spells. Alcohol can make you more drowsy and dizzy. Avoid alcoholic drinks.  Avoid salt substitutes unless you are told otherwise by your doctor or health care professional.  What side effects may I notice from receiving this medicine?  Side effects that you should report to your doctor or health care professional as soon as possible:  -allergic reactions like skin rash, itching or hives, swelling of the hands, feet, face, lips, throat, or tongue  -breathing problems  -signs and symptoms of kidney injury like trouble passing urine or change in the amount of urine  -signs and symptoms of increased potassium like muscle weakness; chest pain; or fast, irregular heartbeat  -signs and symptoms of liver injury like dark yellow or brown urine; general ill feeling or flu-like symptoms; light-colored stools; loss of appetite; nausea; right upper belly pain; unusually weak or tired; yellowing of the eyes or skin  -signs and symptoms of low blood pressure like dizziness; feeling faint or lightheaded, falls; unusually weak or tired  -stomach pain with or without nausea and vomiting  Side effects that usually do not require medical attention (report to your doctor or health care professional if they continue or are bothersome):  -changes in taste  -cough  -dizziness  -fever  -headache  -sensitivity to light  This list may not describe all possible side effects. Call your doctor for medical advice about side effects. You may report side effects to FDA at 1-079-FDA-5910.  Where should I keep my medicine?  Keep out of the reach of children.  Store at room temperature between 15 and 30 degrees C (59 and 86 degrees F). Protect from moisture. Keep container tightly closed. Throw away any unused medicine after the expiration date.  NOTE: This sheet is a summary. It may not cover all possible information. If you have questions about this medicine, talk to  your doctor, pharmacist, or health care provider.  © 2018 [employer] Standard (2017-02-06 12:52:35)  Atorvastatin tablets  What is this medicine?  ATORVASTATIN (a TORE va sta tin) is known as a HMG-CoA reductase inhibitor or 'statin'. It lowers the level of cholesterol and triglycerides in the blood. This drug may also reduce the risk of heart attack, stroke, or other health problems in patients with risk factors for heart disease. Diet and lifestyle changes are often used with this drug.  This medicine may be used for other purposes; ask your health care provider or pharmacist if you have questions.  COMMON BRAND NAME(S): Lipitor  What should I tell my health care provider before I take this medicine?  They need to know if you have any of these conditions:  -frequently drink alcoholic beverages  -history of stroke, TIA  -kidney disease  -liver disease  -muscle aches or weakness  -other medical condition  -an unusual or allergic reaction to atorvastatin, other medicines, foods, dyes, or preservatives  -pregnant or trying to get pregnant  -breast-feeding  How should I use this medicine?  Take this medicine by mouth with a glass of water. Follow the directions on the prescription label. You can take this medicine with or without food. Take your doses at regular intervals. Do not take your medicine more often than directed.  Talk to your pediatrician regarding the use of this medicine in children. While this drug may be prescribed for children as young as 10 years old for selected conditions, precautions do apply.  Overdosage: If you think you have taken too much of this medicine contact a poison control center or emergency room at once.  NOTE: This medicine is only for you. Do not share this medicine with others.  What if I miss a dose?  If you miss a dose, take it as soon as you can. If it is almost time for your next dose, take only that dose. Do not take double or extra doses.  What may interact with this  medicine?  Do not take this medicine with any of the following medications:  -red yeast rice  -telaprevir  -telithromycin  -voriconazole  This medicine may also interact with the following medications:  -alcohol  -antiviral medicines for HIV or AIDS  -boceprevir  -certain antibiotics like clarithromycin, erythromycin, troleandomycin  -certain medicines for cholesterol like fenofibrate or gemfibrozil  -cimetidine  -clarithromycin  -colchicine  -cyclosporine  -digoxin  -female hormones, like estrogens or progestins and birth control pills  -grapefruit juice  -medicines for fungal infections like fluconazole, itraconazole, ketoconazole  -niacin  -rifampin  -spironolactone  This list may not describe all possible interactions. Give your health care provider a list of all the medicines, herbs, non-prescription drugs, or dietary supplements you use. Also tell them if you smoke, drink alcohol, or use illegal drugs. Some items may interact with your medicine.  What should I watch for while using this medicine?  Visit your doctor or health care professional for regular check-ups. You may need regular tests to make sure your liver is working properly.  Tell your doctor or health care professional right away if you get any unexplained muscle pain, tenderness, or weakness, especially if you also have a fever and tiredness. Your doctor or health care professional may tell you to stop taking this medicine if you develop muscle problems. If your muscle problems do not go away after stopping this medicine, contact your health care professional.  This drug is only part of a total heart-health program. Your doctor or a dietician can suggest a low-cholesterol and low-fat diet to help. Avoid alcohol and smoking, and keep a proper exercise schedule.  Do not use this drug if you are pregnant or breast-feeding. Serious side effects to an unborn child or to an infant are possible. Talk to your doctor or pharmacist for more  information.  This medicine may affect blood sugar levels. If you have diabetes, check with your doctor or health care professional before you change your diet or the dose of your diabetic medicine.  If you are going to have surgery tell your health care professional that you are taking this drug.  What side effects may I notice from receiving this medicine?  Side effects that you should report to your doctor or health care professional as soon as possible:  -allergic reactions like skin rash, itching or hives, swelling of the face, lips, or tongue  -dark urine  -fever  -joint pain  -muscle cramps, pain  -redness, blistering, peeling or loosening of the skin, including inside the mouth  -trouble passing urine or change in the amount of urine  -unusually weak or tired  -yellowing of eyes or skin  Side effects that usually do not require medical attention (report to your doctor or health care professional if they continue or are bothersome):  -constipation  -heartburn  -stomach gas, pain, upset  This list may not describe all possible side effects. Call your doctor for medical advice about side effects. You may report side effects to FDA at 7-375-FDA-2104.  Where should I keep my medicine?  Keep out of the reach of children.  Store at room temperature between 20 to 25 degrees C (68 to 77 degrees F). Throw away any unused medicine after the expiration date.  NOTE: This sheet is a summary. It may not cover all possible information. If you have questions about this medicine, talk to your doctor, pharmacist, or health care provider.  © 2018 Elsevier/Gold Standard (2012-11-06 09:18:24)    Aspirin and Your Heart   Aspirin is a medicine that affects the way blood clots. Aspirin can be used to help reduce the risk of blood clots, heart attacks, and other heart-related problems.   SHOULD I TAKE ASPIRIN?  Your health care provider will help you determine whether it is safe and beneficial for you to take aspirin daily. Taking  aspirin daily may be beneficial if you:  · Have had a heart attack or chest pain.  · Have undergone open heart surgery such as coronary artery bypass surgery (CABG).  · Have had coronary angioplasty.  · Have experienced a stroke or transient ischemic attack (TIA).  · Have peripheral vascular disease (PVD).  · Have chronic heart rhythm problems such as atrial fibrillation.  ARE THERE ANY RISKS OF TAKING ASPIRIN DAILY?  Daily use of aspirin can increase your risk of side effects. Some of these include:  · Bleeding. Bleeding problems can be minor or serious. An example of a minor problem is a cut that does not stop bleeding. An example of a more serious problem is stomach bleeding or bleeding into the brain. Your risk of bleeding is increased if you are also taking non-steroidal anti-inflammatory medicine (NSAIDs).  · Increased bruising.  · Upset stomach.  · An allergic reaction. People who have nasal polyps have an increased risk of developing an aspirin allergy.  WHAT ARE SOME GUIDELINES I SHOULD FOLLOW WHEN TAKING ASPIRIN?   · Take aspirin only as directed by your health care provider. Make sure you understand how much you should take and what form you should take. The two forms of aspirin are:  ¨ Non-enteric-coated. This type of aspirin does not have a coating and is absorbed quickly. Non-enteric-coated aspirin is usually recommended for people with chest pain. This type of aspirin also comes in a chewable form.  ¨ Enteric-coated. This type of aspirin has a special coating that releases the medicine very slowly. Enteric-coated aspirin causes less stomach upset than non-enteric-coated aspirin. This type of aspirin should not be chewed or crushed.  · Drink alcohol in moderation. Drinking alcohol increases your risk of bleeding.  WHEN SHOULD I SEEK MEDICAL CARE?   · You have unusual bleeding or bruising.  · You have stomach pain.  · You have an allergic reaction. Symptoms of an allergic reaction  include:  ¨ Hives.  ¨ Itchy skin.  ¨ Swelling of the lips, tongue, or face.  · You have ringing in your ears.  WHEN SHOULD I SEEK IMMEDIATE MEDICAL CARE?   · Your bowel movements are bloody, dark red, or black in color.  · You vomit or cough up blood.  · You have blood in your urine.  · You cough, wheeze, or feel short of breath.  If you have any of the following symptoms, this is an emergency. Do not wait to see if the pain will go away. Get medical help at once. Call your local emergency services (911 in the [place] Do not drive yourself to the hospital.  · You have severe chest pain, especially if the pain is crushing or pressure-like and spreads to the arms, back, neck, or jaw.   · You have stroke-like symptoms, such as:    ¨ Loss of vision.    ¨ Difficulty talking.    ¨ Numbness or weakness on one side of your body.    ¨ Numbness or weakness in your arm or leg.    ¨ Not thinking clearly or feeling confused.       This information is not intended to replace advice given to you by your health care provider. Make sure you discuss any questions you have with your health care provider.     Document Released: 11/30/2009 Document Revised: 01/08/2016 Document Reviewed: 03/25/2015  [employer] Interactive Patient Education ©2016 [employer]      Depression / Suicide Risk    As you are discharged from this [facility] facility, it is important to learn how to keep safe from harming yourself.    Recognize the warning signs:  · Abrupt changes in personality, positive or negative- including increase in energy   · Giving away possessions  · Change in eating patterns- significant weight changes-  positive or negative  · Change in sleeping patterns- unable to sleep or sleeping all the time   · Unwillingness or inability to communicate  · Depression  · Unusual sadness, discouragement and loneliness  · Talk of wanting to die  · Neglect of personal appearance   · Rebelliousness- reckless behavior  · Withdrawal from  people/activities they love  · Confusion- inability to concentrate     If you or a loved one observes any of these behaviors or has concerns about self-harm, here's what you can do:  · Talk about it- your feelings and reasons for harming yourself  · Remove any means that you might use to hurt yourself (examples: pills, rope, extension cords, firearm)  · Get professional help from the community (Mental Health, Substance Abuse, psychological counseling)  · Do not be alone:Call your Safe Contact- someone whom you trust who will be there for you.  · Call your local CRISIS HOTLINE 562-7219 or 776-909-1424  · Call your local Children's Mobile Crisis Response Team [place] (527) 662-3912 or www.DreamCloset.com  · Call the toll free National Suicide Prevention Hotlines   · National Suicide Prevention Lifeline 151-542-WESY (3256)  · National Hope Line Network 800-SUICIDE (577-9281)

## 2020-04-20 NOTE — PROGRESS NOTES
Cardiovascular Nurse Navigator ([phone]) Note:    Patient presented on 4/18/20 via personal vehicle with 10/10 CP. He was taken to cath lab after being found to have STEMI. PCI to prox RCA. EF is 55% per echo. No HF diagnosis.    Reviewed ACS medications:  · DAPT: aspirin + prasugrel: Please note: for ACS patients who are treated medically without PCI and stending, DAPT has been demonstrated to reduce recurrent CV events. Source: 2013 ACCF/AHA Guideline for the management of STEMI  · Beta-Blocker:  lopressor   · Statin:  atorva 80 (needs to be a high intensity statin unless contraindicated: Atorvastatin 80mg or 40mg, or Rosuvastatin 40mg or 20mg  · Consider for aldosterone blockade?  no -- EF is 55%  · Consider for ACE-I/ARB/ARNI?  Lisinopril but not for EF    Meds to Beds BEDSIDE NURSING RESPONSIBILITIES:    Please initiate Meds to Beds for dual antiplatelet therapy:     1. Obtain outpatient order for P2Y12 inhibitor (ticagrelor, clopidogrel, prasugrel) from physician   2. Call [phone] (or, if after hours/weekend, [phone] and request 'on-call anti-coag pharmacist') patient should have med in hand at time of discharge.    Intensive Cardiac Rehab (ICR) Referral:  Referred on 4/18/20; has current inpatient orders for nutrition consult & PT for Phase I ICR    Demographics  [place] address  Insurance: appears to be uninsured I've placed an order for social work informing them of patient's diagnosis and what he will need to manage successfully after discharge asked that they intervene where they can    Inpatient & Discharge Patient Education:  Bedside nursing to continually provide patient education on ACS meds, signs and symptoms to monitor for, and risk factor modification.     Also at discharge please complete the “Acute Coronary Syndrome” special instructions on the AVS.          Follow up care  Voicemail left for hospital schedulers    Follow-up With  Details  Why  Contact Info   [facility]  Go in 1 week   Your physician has referred you to Intensive Cardiac Rehab. You cannot begin ICR for 2 weeks to allow time for your heart to heal. If you do not hear from ICR in about 1 week, please call them to schedule. Thank you!  [address]  [address]  [facility] [id number] [phone]       Thank you and please call with questions.

## 2020-04-20 NOTE — PROGRESS NOTES
Bedside report received from night shift RN. Assumed care. Pt is A&O x 4, Pt is in resting in bed. Pt denies pain at this time. Pt was updated on plan of care. Pt has call light, personal belongings, and bedside table within reach. Bed is in the lowest position and locked. Will continue to monitor.

## 2020-04-20 NOTE — PROGRESS NOTES
Discharge Summary  Educated patient on new medications, up coming appointments, and s/s to look for when returning to the ER. Patient verbalized understanding. PIV removed with no s/s of bleeding or infection at site.  Coupons for medications given to patient and nightly meds given so patient has no medications to take until in am. Vitals WNL. Escorted patient to elevator for discharge home.

## 2020-04-20 NOTE — DISCHARGE PLANNING
Anticipated Discharge Disposition: Home     Action: LSW staffed pt with Dr. Chang and would like for LSW to check prices for lisinopril and EFFIENT.   Pt currently uninsured. LSW tc [facility] pharmacy ([phone]) and was informed that you do not need to be a [facility] member to go to their pharmacy. Pt concerned about him not being a member for [facility] and not being allowed to use their pharmacy.     Barriers to Discharge: None    Plan: LSW to f/u with [facility] pharmacy, LSW to f/u with pt       Addendum 1228   sending scripts to [facility] pharmacy in [place]. [facility] has the lowest prices for pt's medications with GoodRX coupon.   LSW searched lisinopril ($6.31) and EFFIENT ($15.74) prices on GoodRX for [facility]. Pt informed LSW that he is able to afford both of them. LSW has printed coupons for both medications for pt.     Addendum 9060  LSW provided beside RN, Shaniqua GoodRX coupons, [facility] Information, [facility] info to establish PCP, website to apply for Medicaid if [facility] unable to screen pt for Medicaid today. Pt aware that this information will be provided by bedside RN.           Care Transition Team Assessment    The information provided for this assessment was provided by pt and chart review. Pt lives in a weekly motel. Pt currently transitioning to new job which is the reason why he doesn't have insurance. Prior to admit pt states to be independent with ADL's/IADL's.     Information Source  Orientation : Oriented x 4  Information Given By: Patient  Informant's Name: Immanuel Borges  Who is responsible for making decisions for patient? : Patient    Elopement Risk  Legal Hold: No  Ambulatory or Self Mobile in Wheelchair: Yes  Disoriented: No  Psychiatric Symptoms: None  History of Wandering: No  Elopement this Admit: No  Vocalizing Wanting to Leave: No  Displays Behaviors, Body Language Wanting to Leave: No-Not at Risk for Elopement  Elopement Risk: Not at Risk for  Elopement    Interdisciplinary Discharge Planning  Lives with - Patient's Self Care Capacity: Alone and Able to Care For Self  Patient or legal guardian wants to designate a caregiver (see row info): No  Housing / Facility: [facility](weekly)  Prior Services: None    Discharge Preparedness  What is your plan after discharge?: Home with help  What are your discharge supports?: Sibling  Prior Functional Level: Independent with Activities of Daily Living, Independent with Medication Management  Difficulity with ADLs: None  Difficulity with IADLs: None    Functional Assesment  Prior Functional Level: Independent with Activities of Daily Living, Independent with Medication Management    Finances  Financial Barriers to Discharge: No  Prescription Coverage: No  Prescription Coverage Comments: (New job and pending new insurance. )    Vision / Hearing Impairment  Vision Impairment : No  Hearing Impairment : No    Domestic Abuse  Have you ever been the victim of abuse or violence?: No  Physical Abuse or Sexual Abuse: No  Verbal Abuse or Emotional Abuse: No  Possible Abuse Reported to:: Not Applicable    Discharge Risks or Barriers  Discharge risks or barriers?: Uninsured / underinsured  Patient risk factors: No PCP, Uninsured or underinsured    Anticipated Discharge Information  Anticipated discharge disposition: Home

## 2020-04-20 NOTE — DISCHARGE SUMMARY
Discharge Summary    CHIEF COMPLAINT ON ADMISSION  Chief Complaint   Patient presents with   • Chest Pain       Reason for Admission  Chest Pain/inferior myocardial infarction    Admission Date  4/18/2020    CODE STATUS  Full Code    HPI & HOSPITAL COURSE    *45-year-old male with no significant past medical history except smoking presented 4/18 with chest pain, the patient does not have diabetes or hypertension no family history of coronary artery disease, he developed severe pressure chest pain, on admission EKG showed ST elevation in the inferior leads, the patient was transfer to cardiac cath immediately and a stent was placed on the RCA, the patient has been doing better after cardiac cath, no significant chest pain,  and no erythremia, echo after the cardiac cath showed normal ejection fraction 55% with inferior wall segment hypokinesis.  Restarted with aspirin, Effient and high intensity statin atorvastatin, also restarted with lisinopril 5 mg increases to 10 mg later and metoprolol 12.5 mg twice daily, to follow-up closely with cardiology clinic.  His labs showed A1c 5.3, however his blood pressure has been high, he is on metoprolol and lisinopril and possible increase the dose if needed.   The patient is a smoker, I had long discussion with the patient about the importance of quitting smoking and discussed the risk of heart failure and death he agreed and he is willing to quit.  The plan of care was discussed in detail with the patient and answered all his questions, all his medication went to his pharmacy to make sure he will have this medications also I discussed with him the importance of medication adherence to prevent heart attack in future, the patient understood and agreed.     Therefore, he is discharged in good and stable condition, after was cleared by cardiologist,  to home with close outpatient follow-up.    The patient met 2-midnight criteria for an inpatient stay at the time of  discharge.    Discharge Date  04/20/20      FOLLOW UP ITEMS POST DISCHARGE  Follow-up with cardiology clinic for coronary artery disease  Follow-up with primary care doctor for blood pressure management.    DISCHARGE DIAGNOSES  Principal Problem (Resolved):    STEMI (ST elevation myocardial infarction) ([facility]) POA: Yes  Active Problems:    Coronary artery disease POA: Yes    Tobacco abuse POA: Yes  Resolved Problems:    Hypokalemia POA: Yes      FOLLOW UP  Future Appointments   Date Time Provider Department Center   4/30/2020  9:15 AM STEVAN Seaman [facility] None     [facility]  [address]  [address]  [facility] [id number] [phone]  Go in 1 week  Your physician has referred you to Intensive Cardiac Rehab. You cannot begin ICR for 2 weeks to allow time for your heart to heal. If you do not hear from ICR in about 1 week, please call them to schedule. Thank you!    [address] [id number] [phone]  Schedule an appointment as soon as possible for a visit in 3 weeks  Please call to establish with a Primary Care Physicain and schedule your hospital follow up. Thank you.      MEDICATIONS ON DISCHARGE     Medication List      START taking these medications      Instructions   acetaminophen 325 MG Tabs  Commonly known as:  TYLENOL   Take 2 Tabs by mouth every 6 hours as needed (Mild Pain; (Pain scale 1-3); Temp greater than 100.5 F).  Dose:  650 mg     aspirin 81 MG EC tablet  Start taking on:  April 21, 2020   Take 1 Tab by mouth every day.  Dose:  81 mg     atorvastatin 80 MG tablet  Commonly known as:  LIPITOR   Take 1 Tab by mouth every evening.  Dose:  80 mg     lisinopril 10 MG Tabs  Start taking on:  April 21, 2020  Commonly known as:  PRINIVIL   Take 1 Tab by mouth every day.  Dose:  10 mg     metoprolol 25 MG Tabs  Commonly known as:  LOPRESSOR   Take 0.5 Tabs by mouth 2 Times a Day.  Dose:  12.5 mg     nitroglycerin  0.4 MG Subl  Commonly known as:  NITROSTAT   Place 1 Tab under tongue as needed for Chest Pain (up to 3 doses (if SBP greater than 90 mmHg)).  Dose:  0.4 mg     prasugrel 10 MG Tabs  Commonly known as:  EFFIENT   Doctor's comments:  Meds to Beds - pls deliver to Anticoag office  Take 1 Tab by mouth every day.  Dose:  10 mg            Allergies  No Known Allergies    DIET  Orders Placed This Encounter   Procedures   • Diet Order Cardiac     Standing Status:   Standing     Number of Occurrences:   1     Order Specific Question:   Diet:     Answer:   Cardiac [6]       ACTIVITY  As tolerated.  Weight bearing as tolerated    CONSULTATIONS  cardio    PROCEDURES  Cardiac cath with stent     LABORATORY  Lab Results   Component Value Date    SODIUM 138 04/20/2020    POTASSIUM 3.9 04/20/2020    CHLORIDE 103 04/20/2020    CO2 22 04/20/2020    GLUCOSE 108 (H) 04/20/2020    BUN 14 04/20/2020    CREATININE 0.99 04/20/2020        Lab Results   Component Value Date    WBC 6.0 04/20/2020    HEMOGLOBIN 14.0 04/20/2020    HEMATOCRIT 42.5 04/20/2020    PLATELETCT 151 (L) 04/20/2020        Total time of the discharge process exceeds 34 minutes.

## 2020-04-30 ENCOUNTER — TELEMEDICINE (OUTPATIENT)
Dept: CARDIOLOGY | Facility: MEDICAL CENTER | Age: 45
End: 2020-04-30
Payer: COMMERCIAL

## 2020-04-30 ENCOUNTER — DOCUMENTATION (OUTPATIENT)
Dept: CARDIOLOGY | Facility: MEDICAL CENTER | Age: 45
End: 2020-04-30

## 2020-04-30 VITALS — HEIGHT: 67 IN | BODY MASS INDEX: 25.86 KG/M2

## 2020-04-30 DIAGNOSIS — Z72.0 TOBACCO ABUSE: ICD-10-CM

## 2020-04-30 DIAGNOSIS — E78.49 OTHER HYPERLIPIDEMIA: ICD-10-CM

## 2020-04-30 DIAGNOSIS — I21.11 ST ELEVATION MYOCARDIAL INFARCTION INVOLVING RIGHT CORONARY ARTERY (HCC): ICD-10-CM

## 2020-04-30 DIAGNOSIS — I10 ESSENTIAL HYPERTENSION: ICD-10-CM

## 2020-04-30 DIAGNOSIS — I25.10 CORONARY ARTERY DISEASE INVOLVING NATIVE HEART WITHOUT ANGINA PECTORIS, UNSPECIFIED VESSEL OR LESION TYPE: ICD-10-CM

## 2020-04-30 PROCEDURE — 99442 PR PHYSICIAN TELEPHONE EVALUATION 11-20 MIN: CPT | Mod: CR | Performed by: NURSE PRACTITIONER

## 2020-04-30 RX ORDER — ATORVASTATIN CALCIUM 80 MG/1
80 TABLET, FILM COATED ORAL EVERY EVENING
Qty: 90 TAB | Refills: 3 | Status: SHIPPED | OUTPATIENT
Start: 2020-04-30 | End: 2020-10-15 | Stop reason: SDUPTHER

## 2020-04-30 RX ORDER — PRASUGREL 10 MG/1
10 TABLET, FILM COATED ORAL DAILY
Qty: 90 TAB | Refills: 3 | Status: SHIPPED | OUTPATIENT
Start: 2020-04-30 | End: 2020-10-15 | Stop reason: SDUPTHER

## 2020-04-30 RX ORDER — LISINOPRIL 10 MG/1
10 TABLET ORAL DAILY
Qty: 90 TAB | Refills: 3 | Status: SHIPPED | OUTPATIENT
Start: 2020-04-30 | End: 2020-10-15 | Stop reason: SDUPTHER

## 2020-04-30 RX ORDER — ASPIRIN 81 MG/1
81 TABLET ORAL DAILY
Qty: 90 TAB | Refills: 3 | Status: ON HOLD | OUTPATIENT
Start: 2020-04-30 | End: 2021-01-23 | Stop reason: SDUPTHER

## 2020-04-30 NOTE — PROGRESS NOTES
[facility] lab orders mailed to pts home address to complete in 3 months before appt with VICTOR HUGO

## 2020-04-30 NOTE — PROGRESS NOTES
Telephone Appointment Visit   As a means of avoiding spread of COVID-19, this visit is being conducted by telephone. This telephone visit was initiated by the patient and they verbally consented.    Time at start of call: 0925    Reason for Call:  Hospital Follow-up    Patient Comments / History:   New patient to our office for establishment of cardiac care for new onset CAD with recent hospitalization for STEMI with placement of BRIAN to RCA.    He is overall doing well, no concerns or complaints. He just lost his job, so he is not working due to COVID-19. We did discuss that he is able to resume work 2 weeks after his MI if wanted.    He is taking his medications. We discussed the importance of them.    He is still smoking. He is trying to quit, knows the importance of smoking cessation. He doesn't want any resources to quit.    He is walking without any discomforts or symptoms.    He had no questions about his heart condition. He did find out he has a family hx of CAD after talking to his family.     Labs / Images Reviewed   Reviewed hospital labs, echo, cardiac cath, and EKG.    Assessment and Plan:     1. Coronary artery disease involving native heart without angina pectoris, unspecified vessel or lesion type  - Comp Metabolic Panel; Future  - Lipid Profile; Future  - CBC WITHOUT DIFFERENTIAL; Future    2. Tobacco abuse    3. Other hyperlipidemia    4. Essential hypertension    5. ST elevation myocardial infarction involving right coronary artery (HCC)  - prasugrel (EFFIENT) 10 MG Tab; Take 1 Tab by mouth every day.  Dispense: 90 Tab; Refill: 3    Other orders  - aspirin 81 MG EC tablet; Take 1 Tab by mouth every day.  Dispense: 90 Tab; Refill: 3  - atorvastatin (LIPITOR) 80 MG tablet; Take 1 Tab by mouth every evening.  Dispense: 90 Tab; Refill: 3  - metoprolol (LOPRESSOR) 25 MG Tab; Take 0.5 Tabs by mouth 2 Times a Day.  Dispense: 90 Tab; Refill: 3  - lisinopril (PRINIVIL) 10 MG Tab; Take 1 Tab by mouth every  day.  Dispense: 90 Tab; Refill: 3    1. CAD S/P STEMI with placement of BRIAN to RCA  -no angina or SHAHID  -cont asa lifelong, effient (1 year), statin lifelong  -discussed importance of medications, to call our office if unable to afford prescriptions  -cont bb and ace until next apt, no vitals today-no home machine-recommend obtaining one if able  -echo WNL  -not interested in cardiac rehab    2. HLD  -statin  -LDL goal <70 with CAD  -repeat lipid and cmp in 3 months    3. HTN  -unable to take BP today  -recommend obtain home BP cuff, if not we can adjust meds at next visit  -cont metoprolol and lisinopril for now    4. Tobacco abuse  -smoking cessation highly recommended  -no resources wanted at this time    Follow-up: Return in about 3 months (around 7/30/2020) for FU with JI with labs .    Time at end of call: 3386  Total Time Spent: 11-20 minutes    MARILYN Seaman.

## 2020-10-15 ENCOUNTER — APPOINTMENT (OUTPATIENT)
Dept: RADIOLOGY | Facility: MEDICAL CENTER | Age: 45
End: 2020-10-15
Attending: EMERGENCY MEDICINE
Payer: MEDICAID

## 2020-10-15 ENCOUNTER — HOSPITAL ENCOUNTER (EMERGENCY)
Facility: MEDICAL CENTER | Age: 45
End: 2020-10-15
Attending: EMERGENCY MEDICINE
Payer: MEDICAID

## 2020-10-15 VITALS
DIASTOLIC BLOOD PRESSURE: 124 MMHG | RESPIRATION RATE: 13 BRPM | OXYGEN SATURATION: 96 % | WEIGHT: 161.16 LBS | BODY MASS INDEX: 25.24 KG/M2 | HEART RATE: 87 BPM | TEMPERATURE: 97.4 F | SYSTOLIC BLOOD PRESSURE: 251 MMHG

## 2020-10-15 DIAGNOSIS — R10.13 EPIGASTRIC ABDOMINAL PAIN: ICD-10-CM

## 2020-10-15 DIAGNOSIS — I21.11 ST ELEVATION MYOCARDIAL INFARCTION INVOLVING RIGHT CORONARY ARTERY (HCC): ICD-10-CM

## 2020-10-15 DIAGNOSIS — Z91.148 NON COMPLIANCE W MEDICATION REGIMEN: ICD-10-CM

## 2020-10-15 DIAGNOSIS — I10 HYPERTENSION, UNSPECIFIED TYPE: ICD-10-CM

## 2020-10-15 LAB
ALBUMIN SERPL BCP-MCNC: 4.5 G/DL (ref 3.2–4.9)
ALBUMIN/GLOB SERPL: 1.3 G/DL
ALP SERPL-CCNC: 72 U/L (ref 30–99)
ALT SERPL-CCNC: 17 U/L (ref 2–50)
ANION GAP SERPL CALC-SCNC: 13 MMOL/L (ref 7–16)
APPEARANCE UR: CLEAR
AST SERPL-CCNC: 17 U/L (ref 12–45)
BASOPHILS # BLD AUTO: 0.4 % (ref 0–1.8)
BASOPHILS # BLD: 0.03 K/UL (ref 0–0.12)
BILIRUB SERPL-MCNC: 0.2 MG/DL (ref 0.1–1.5)
BILIRUB UR QL STRIP.AUTO: NEGATIVE
BUN SERPL-MCNC: 9 MG/DL (ref 8–22)
CALCIUM SERPL-MCNC: 9.9 MG/DL (ref 8.5–10.5)
CHLORIDE SERPL-SCNC: 104 MMOL/L (ref 96–112)
CO2 SERPL-SCNC: 23 MMOL/L (ref 20–33)
COLOR UR: YELLOW
CREAT SERPL-MCNC: 1.18 MG/DL (ref 0.5–1.4)
EKG IMPRESSION: NORMAL
EOSINOPHIL # BLD AUTO: 0.01 K/UL (ref 0–0.51)
EOSINOPHIL NFR BLD: 0.1 % (ref 0–6.9)
ERYTHROCYTE [DISTWIDTH] IN BLOOD BY AUTOMATED COUNT: 44.7 FL (ref 35.9–50)
GLOBULIN SER CALC-MCNC: 3.6 G/DL (ref 1.9–3.5)
GLUCOSE SERPL-MCNC: 95 MG/DL (ref 65–99)
GLUCOSE UR STRIP.AUTO-MCNC: NEGATIVE MG/DL
HCT VFR BLD AUTO: 48.1 % (ref 42–52)
HGB BLD-MCNC: 16 G/DL (ref 14–18)
IMM GRANULOCYTES # BLD AUTO: 0.01 K/UL (ref 0–0.11)
IMM GRANULOCYTES NFR BLD AUTO: 0.1 % (ref 0–0.9)
KETONES UR STRIP.AUTO-MCNC: NEGATIVE MG/DL
LEUKOCYTE ESTERASE UR QL STRIP.AUTO: NEGATIVE
LIPASE SERPL-CCNC: 55 U/L (ref 11–82)
LYMPHOCYTES # BLD AUTO: 4.32 K/UL (ref 1–4.8)
LYMPHOCYTES NFR BLD: 63.3 % (ref 22–41)
MCH RBC QN AUTO: 27.9 PG (ref 27–33)
MCHC RBC AUTO-ENTMCNC: 33.3 G/DL (ref 33.7–35.3)
MCV RBC AUTO: 83.8 FL (ref 81.4–97.8)
MICRO URNS: ABNORMAL
MONOCYTES # BLD AUTO: 0.29 K/UL (ref 0–0.85)
MONOCYTES NFR BLD AUTO: 4.3 % (ref 0–13.4)
NEUTROPHILS # BLD AUTO: 2.16 K/UL (ref 1.82–7.42)
NEUTROPHILS NFR BLD: 31.8 % (ref 44–72)
NITRITE UR QL STRIP.AUTO: NEGATIVE
NRBC # BLD AUTO: 0 K/UL
NRBC BLD-RTO: 0 /100 WBC
PH UR STRIP.AUTO: 8.5 [PH] (ref 5–8)
PLATELET # BLD AUTO: 201 K/UL (ref 164–446)
PMV BLD AUTO: 11.3 FL (ref 9–12.9)
POTASSIUM SERPL-SCNC: 4.4 MMOL/L (ref 3.6–5.5)
PROT SERPL-MCNC: 8.1 G/DL (ref 6–8.2)
PROT UR QL STRIP: NEGATIVE MG/DL
RBC # BLD AUTO: 5.74 M/UL (ref 4.7–6.1)
RBC UR QL AUTO: NEGATIVE
SODIUM SERPL-SCNC: 140 MMOL/L (ref 135–145)
SP GR UR STRIP.AUTO: 1.02
TROPONIN T SERPL-MCNC: 11 NG/L (ref 6–19)
TROPONIN T SERPL-MCNC: 9 NG/L (ref 6–19)
UROBILINOGEN UR STRIP.AUTO-MCNC: 1 MG/DL
WBC # BLD AUTO: 6.8 K/UL (ref 4.8–10.8)

## 2020-10-15 PROCEDURE — 700102 HCHG RX REV CODE 250 W/ 637 OVERRIDE(OP): Performed by: EMERGENCY MEDICINE

## 2020-10-15 PROCEDURE — 96376 TX/PRO/DX INJ SAME DRUG ADON: CPT

## 2020-10-15 PROCEDURE — 96374 THER/PROPH/DIAG INJ IV PUSH: CPT | Mod: XU

## 2020-10-15 PROCEDURE — 93005 ELECTROCARDIOGRAM TRACING: CPT

## 2020-10-15 PROCEDURE — 74177 CT ABD & PELVIS W/CONTRAST: CPT

## 2020-10-15 PROCEDURE — 85025 COMPLETE CBC W/AUTO DIFF WBC: CPT

## 2020-10-15 PROCEDURE — 83690 ASSAY OF LIPASE: CPT

## 2020-10-15 PROCEDURE — 96375 TX/PRO/DX INJ NEW DRUG ADDON: CPT

## 2020-10-15 PROCEDURE — A9270 NON-COVERED ITEM OR SERVICE: HCPCS | Performed by: EMERGENCY MEDICINE

## 2020-10-15 PROCEDURE — 99285 EMERGENCY DEPT VISIT HI MDM: CPT

## 2020-10-15 PROCEDURE — 80053 COMPREHEN METABOLIC PANEL: CPT

## 2020-10-15 PROCEDURE — 93005 ELECTROCARDIOGRAM TRACING: CPT | Mod: XE,76 | Performed by: EMERGENCY MEDICINE

## 2020-10-15 PROCEDURE — 84484 ASSAY OF TROPONIN QUANT: CPT

## 2020-10-15 PROCEDURE — 700117 HCHG RX CONTRAST REV CODE 255: Performed by: EMERGENCY MEDICINE

## 2020-10-15 PROCEDURE — 81003 URINALYSIS AUTO W/O SCOPE: CPT

## 2020-10-15 PROCEDURE — 71045 X-RAY EXAM CHEST 1 VIEW: CPT

## 2020-10-15 PROCEDURE — 700111 HCHG RX REV CODE 636 W/ 250 OVERRIDE (IP): Performed by: EMERGENCY MEDICINE

## 2020-10-15 RX ORDER — MORPHINE SULFATE 4 MG/ML
4 INJECTION, SOLUTION INTRAMUSCULAR; INTRAVENOUS ONCE
Status: COMPLETED | OUTPATIENT
Start: 2020-10-15 | End: 2020-10-15

## 2020-10-15 RX ORDER — ONDANSETRON 2 MG/ML
4 INJECTION INTRAMUSCULAR; INTRAVENOUS ONCE
Status: COMPLETED | OUTPATIENT
Start: 2020-10-15 | End: 2020-10-15

## 2020-10-15 RX ORDER — ATORVASTATIN CALCIUM 80 MG/1
80 TABLET, FILM COATED ORAL EVERY EVENING
Qty: 90 TAB | Refills: 3 | Status: ON HOLD | OUTPATIENT
Start: 2020-10-15 | End: 2021-01-23 | Stop reason: SDUPTHER

## 2020-10-15 RX ORDER — PRASUGREL 10 MG/1
10 TABLET, FILM COATED ORAL DAILY
Qty: 90 TAB | Refills: 3 | Status: ON HOLD | OUTPATIENT
Start: 2020-10-15 | End: 2021-01-23 | Stop reason: SDUPTHER

## 2020-10-15 RX ORDER — LISINOPRIL 10 MG/1
10 TABLET ORAL ONCE
Status: COMPLETED | OUTPATIENT
Start: 2020-10-15 | End: 2020-10-15

## 2020-10-15 RX ORDER — FAMOTIDINE 20 MG/1
20 TABLET, FILM COATED ORAL 2 TIMES DAILY
Qty: 60 TAB | Refills: 0 | Status: ON HOLD | OUTPATIENT
Start: 2020-10-15 | End: 2021-01-23 | Stop reason: SDUPTHER

## 2020-10-15 RX ORDER — LISINOPRIL 10 MG/1
10 TABLET ORAL DAILY
Qty: 90 TAB | Refills: 3 | Status: ON HOLD | OUTPATIENT
Start: 2020-10-15 | End: 2021-01-23

## 2020-10-15 RX ADMIN — MORPHINE SULFATE 4 MG: 4 INJECTION INTRAVENOUS at 20:51

## 2020-10-15 RX ADMIN — LISINOPRIL 10 MG: 10 TABLET ORAL at 22:10

## 2020-10-15 RX ADMIN — METOPROLOL TARTRATE 25 MG: 25 TABLET, FILM COATED ORAL at 22:10

## 2020-10-15 RX ADMIN — MORPHINE SULFATE 4 MG: 4 INJECTION INTRAVENOUS at 22:10

## 2020-10-15 RX ADMIN — LIDOCAINE HYDROCHLORIDE 30 ML: 20 SOLUTION OROPHARYNGEAL at 22:45

## 2020-10-15 RX ADMIN — IOHEXOL 100 ML: 350 INJECTION, SOLUTION INTRAVENOUS at 21:40

## 2020-10-15 RX ADMIN — ONDANSETRON 4 MG: 2 INJECTION INTRAMUSCULAR; INTRAVENOUS at 20:51

## 2020-10-15 ASSESSMENT — FIBROSIS 4 INDEX: FIB4 SCORE: 3.37

## 2020-10-16 NOTE — ED TRIAGE NOTES
Pt comes in complaining of generalized abd pain starting this morning but worsening over the past 4 hours. Pt appears uncomfortable in triage.

## 2020-10-16 NOTE — ED NOTES
"pts visitor yelling in the lobby \"hes having a heart attack and dying\" visitor and pt informed his EKG was okay and his lab work is back and his troponin is normal. Visitor continues to interrupt RN and yell at staff about how awful we are. Repeat EKG called for.   "

## 2020-10-16 NOTE — ED NOTES
Immanuel Borges is being discharged from the Emergency Department in stable condition. Discharge and follow up instructions were given to patient.   Prescriptions were explained to the patient. Immanuel Borges is alert and oriented and verbalizes understanding. VSS. The patient ambulates with steady gait.

## 2020-10-16 NOTE — ED PROVIDER NOTES
Dictation #1  MRN:[id number]  CSN:[id number]  ED Provider Note    CHIEF COMPLAINT  Chief Complaint   Patient presents with   • Abdominal Pain   • Epigastric Pain       HPI  Immanuel Borges is a 45 y.o. male who presents to the emerge department complaining of midepigastric abdominal discomfort.  Past medical history significant for prior STEMI.  Now on dual agent blood pressure medication, cholesterol medication and blood thinner.  Says that he is not been compliant with these over the last week.  He does smoke both tobacco and marijuana but denies any other illicit substances and denies alcohol.  He has had persistent pain throughout the day.  Now moderate to severe.  No notable aggravating or alleviating factors.  Pain is largely focal.  Nonradiating.  Slight nausea but no vomiting or diarrhea.  He has had regular stools.  Good urine output.  No unusual food.  No travel.  No known sick contacts including that of COVID.    REVIEW OF SYSTEMS  See HPI for further details. All other systems are negative.     PAST MEDICAL HISTORY   has a past medical history of STEMI (ST elevation myocardial infarction) (HCC).    SOCIAL HISTORY  Social History     Tobacco Use   • Smoking status: Light Tobacco Smoker     Types: Cigarettes   • Smokeless tobacco: Never Used   Substance and Sexual Activity   • Alcohol use: Not Currently   • Drug use: Yes     Comment: marijuana   • Sexual activity: Not on file       SURGICAL HISTORY  patient denies any surgical history    CURRENT MEDICATIONS  Home Medications    **Home medications have not yet been reviewed for this encounter**         ALLERGIES  No Known Allergies    PHYSICAL EXAM  VITAL SIGNS: BP (!) 251/124 Comment: MD aware; pt medicated for BP and given rx. Pt has been off BP meds for a week  Pulse 87   Temp 36.3 °C (97.4 °F) (Temporal)   Resp 13   Wt 73.1 kg (161 lb 2.5 oz)   SpO2 96%   BMI 25.24 kg/m²  @BETO[[id number]::@   Pulse ox interpretation: I interpret this pulse ox as  normal.  Constitutional: Alert in moderate pain distress  HENT: No signs of trauma, Bilateral external ears normal, Nose normal.   Eyes: Pupils are equal and reactive, Conjunctiva normal, Non-icteric.   Neck: Normal range of motion, No tenderness, Supple, No stridor.   Cardiovascular: Regular rate and rhythm, no murmurs.   Thorax & Lungs: Normal breath sounds, No respiratory distress, No wheezing, No chest tenderness.   Abdomen: Bowel sounds normal, Soft, midepigastric tenderness with relative deep palpation, no masses, No pulsatile masses. No peritoneal signs.  Skin: Warm, Dry, No erythema, No rash.   Extremities: Intact distal pulses, No edema, No tenderness  Musculoskeletal: Good range of motion in all major joints. No tenderness to palpation or major deformities noted.   Neurologic: Alert , Normal motor function, Normal sensory function, No focal deficits noted.   Psychiatric: Affect normal, Judgment normal, Mood normal.       DIAGNOSTIC STUDIES / PROCEDURES    EKG  Results for orders placed or performed during the hospital encounter of 10/15/20   EKG   Result Value Ref Range    Report       [facility] Emergency Dept.    Test Date:  2020-10-15  Pt Name:    LOIS CRENSHAW                  Department: ER  MRN:        [id number]                      Room:  Gender:     Male                         Technician: [id number]  :        1975                   Requested By:ER TRIAGE PROTOCOL  Order #:    [id number]                    Reading MD:    Measurements  Intervals                                Axis  Rate:       84                           P:          71  RI:         138                          QRS:        48  QRSD:       94                           T:          -30  QT:         413  QTc:        489    Interpretive Statements  Sinus rhythm  Inferior infarct, age indeterminate  Compared to ECG 2020 09:52:24  No significant changes     EKG   Result Value Ref Range    Report       [facility] Emergency Dept.    Test Date:  2020-10-15  Pt Name:    LOIS CRENHSAW                  Department: ER  MRN:        [id number]                      Room:  Gender:     Male                         Technician: [id number]  :        1975                   Requested By:ER TRIAGE PROTOCOL  Order #:    [id number]                    Reading MD:    Measurements  Intervals                                Axis  Rate:       78                           P:          61  HI:         122                          QRS:        52  QRSD:       99                           T:          -13  QT:         434  QTc:        495    Interpretive Statements  Sinus rhythm  Probable inferior infarct, old  Baseline wander in lead(s) I,II,III,aVR,aVF  Compared to ECG 10/15/2020 17:21:46  No significant changes     EKG   Result Value Ref Range    Report       [facility] Emergency Dept.    Test Date:  2020-10-15  Pt Name:    LOIS CRENSHAW                  Department: ER  MRN:        [id number]                      Room:  Gender:     Male                         Technician: [id number]  :        1975                   Requested By:ER TRIAGE PROTOCOL  Order #:    [id number]                    Reading MD:    Measurements  Intervals                                Axis  Rate:       74                           P:          69  HI:         121                          QRS:        51  QRSD:       103                          T:          13  QT:         448  QTc:        497    Interpretive Statements  Sinus rhythm  Probable inferior infarct, old  Minimal ST elevation, anterior leads  Compared to ECG 10/15/2020 17:21:46  ST (T wave) deviation now present  Myocardial infarct finding still present         LABS  Results for orders placed or performed during the hospital encounter of 10/15/20   CBC WITH DIFFERENTIAL   Result Value Ref Range    WBC 6.8 4.8 - 10.8 K/uL    RBC 5.74 4.70 - 6.10 M/uL    Hemoglobin 16.0 14.0 - 18.0  g/dL    Hematocrit 48.1 42.0 - 52.0 %    MCV 83.8 81.4 - 97.8 fL    MCH 27.9 27.0 - 33.0 pg    MCHC 33.3 (L) 33.7 - 35.3 g/dL    RDW 44.7 35.9 - 50.0 fL    Platelet Count 201 164 - 446 K/uL    MPV 11.3 9.0 - 12.9 fL    Neutrophils-Polys 31.80 (L) 44.00 - 72.00 %    Lymphocytes 63.30 (H) 22.00 - 41.00 %    Monocytes 4.30 0.00 - 13.40 %    Eosinophils 0.10 0.00 - 6.90 %    Basophils 0.40 0.00 - 1.80 %    Immature Granulocytes 0.10 0.00 - 0.90 %    Nucleated RBC 0.00 /100 WBC    Neutrophils (Absolute) 2.16 1.82 - 7.42 K/uL    Lymphs (Absolute) 4.32 1.00 - 4.80 K/uL    Monos (Absolute) 0.29 0.00 - 0.85 K/uL    Eos (Absolute) 0.01 0.00 - 0.51 K/uL    Baso (Absolute) 0.03 0.00 - 0.12 K/uL    Immature Granulocytes (abs) 0.01 0.00 - 0.11 K/uL    NRBC (Absolute) 0.00 K/uL   COMP METABOLIC PANEL   Result Value Ref Range    Sodium 140 135 - 145 mmol/L    Potassium 4.4 3.6 - 5.5 mmol/L    Chloride 104 96 - 112 mmol/L    Co2 23 20 - 33 mmol/L    Anion Gap 13.0 7.0 - 16.0    Glucose 95 65 - 99 mg/dL    Bun 9 8 - 22 mg/dL    Creatinine 1.18 0.50 - 1.40 mg/dL    Calcium 9.9 8.5 - 10.5 mg/dL    AST(SGOT) 17 12 - 45 U/L    ALT(SGPT) 17 2 - 50 U/L    Alkaline Phosphatase 72 30 - 99 U/L    Total Bilirubin 0.2 0.1 - 1.5 mg/dL    Albumin 4.5 3.2 - 4.9 g/dL    Total Protein 8.1 6.0 - 8.2 g/dL    Globulin 3.6 (H) 1.9 - 3.5 g/dL    A-G Ratio 1.3 g/dL   LIPASE   Result Value Ref Range    Lipase 55 11 - 82 U/L   URINALYSIS,CULTURE IF INDICATED    Specimen: Urine   Result Value Ref Range    Color Yellow     Character Clear     Specific Gravity 1.023 <1.035    Ph 8.5 (A) 5.0 - 8.0    Glucose Negative Negative mg/dL    Ketones Negative Negative mg/dL    Protein Negative Negative mg/dL    Bilirubin Negative Negative    Urobilinogen, Urine 1.0 Negative    Nitrite Negative Negative    Leukocyte Esterase Negative Negative    Occult Blood Negative Negative    Micro Urine Req see below    TROPONIN   Result Value Ref Range    Troponin T 11 6 - 19 ng/L    ESTIMATED GFR   Result Value Ref Range    GFR If African American >60 >60 mL/min/1.73 m 2    GFR If Non African American >60 >60 mL/min/1.73 m 2   TROPONIN   Result Value Ref Range    Troponin T 9 6 - 19 ng/L   EKG   Result Value Ref Range    Report       [facility] Emergency Dept.    Test Date:  2020-10-15  Pt Name:    LOIS CRENSHAW                  Department: ER  MRN:        [id number]                      Room:  Gender:     Male                         Technician: [id number]  :        1975                   Requested By:ER TRIAGE PROTOCOL  Order #:    [id number]                    Reading MD:    Measurements  Intervals                                Axis  Rate:       84                           P:          71  MT:         138                          QRS:        48  QRSD:       94                           T:          -30  QT:         413  QTc:        489    Interpretive Statements  Sinus rhythm  Inferior infarct, age indeterminate  Compared to ECG 2020 09:52:24  No significant changes     EKG   Result Value Ref Range    Report       [facility] Emergency Dept.    Test Date:  2020-10-15  Pt Name:    LOIS CRENSHAW                  Department: ER  MRN:        [id number]                      Room:  Gender:     Male                         Technician: [id number]  :        1975                   Requested By:ER TRIAGE PROTOCOL  Order #:    [id number]                    Reading MD:    Measurements  Intervals                                Axis  Rate:       78                           P:          61  MT:         122                          QRS:        52  QRSD:       99                           T:          -13  QT:         434  QTc:        495    Interpretive Statements  Sinus rhythm  Probable inferior infarct, old  Baseline wander in lead(s) I,II,III,aVR,aVF  Compared to ECG 10/15/2020 17:21:46  No significant changes     EKG   Result Value Ref Range    Report        [facility] Emergency Dept.    Test Date:  2020-10-15  Pt Name:    LOIS CRENSHAW                  Department: ER  MRN:        [id number]                      Room:  Gender:     Male                         Technician: [id number]  :        1975                   Requested By:ER TRIAGE PROTOCOL  Order #:    [id number]                    Reading MD:    Measurements  Intervals                                Axis  Rate:       74                           P:          69  CA:         121                          QRS:        51  QRSD:       103                          T:          13  QT:         448  QTc:        497    Interpretive Statements  Sinus rhythm  Probable inferior infarct, old  Minimal ST elevation, anterior leads  Compared to ECG 10/15/2020 17:21:46  ST (T wave) deviation now present  Myocardial infarct finding still present           RADIOLOGY  DX-CHEST-PORTABLE (1 VIEW)   Final Result      No acute cardiopulmonary abnormality.      CT-ABDOMEN-PELVIS WITH   Final Result         1.  Mildly distended gallbladder. No calcified stones.   2.  Atherosclerosis.   3.  Mild colonic diverticulosis without acute diverticulitis.                     COURSE & MEDICAL DECISION MAKING  Pertinent Labs & Imaging studies reviewed. (See chart for details)  Patient presented emerge department with midepigastric abdominal discomfort.  History as above.  Patient with longstanding history of hypertension and noncompliant with his medication regimen likely driving his blood pressure is higher today.  Delta troponins are downtrending and negative.  EKG is unchanged from prior although he does have his history of RCA infarct.  This does not appear to be an anginal equivalent from a presentation standpoint.  This point I think he is at the upper end of low risk from a heart score standpoint.  At this point I will discharge home with outpatient follow-up with PCP is again referred.  Also outpatient follow-up with  cardiology and also GI.  I have started him on Pepcid if he does have a recurrence gastritis or peptic ulcer disease process as he has had previously.       The patient will return for worsening symptoms and is stable at the time of discharge. The patient verbalizes understanding and will comply.    FINAL IMPRESSION  1. Epigastric abdominal pain    2. Hypertension, unspecified type    3. Non compliance w medication regimen    4. ST elevation myocardial infarction involving right coronary artery (HCC)            Electronically signed by: Samuel Tamez M.D., 10/15/2020 10:37 PM

## 2021-01-21 ENCOUNTER — APPOINTMENT (OUTPATIENT)
Dept: CARDIOLOGY | Facility: MEDICAL CENTER | Age: 46
DRG: 246 | End: 2021-01-21
Attending: INTERNAL MEDICINE
Payer: MEDICAID

## 2021-01-21 ENCOUNTER — HOSPITAL ENCOUNTER (INPATIENT)
Facility: MEDICAL CENTER | Age: 46
LOS: 2 days | DRG: 246 | End: 2021-01-23
Attending: INTERNAL MEDICINE | Admitting: STUDENT IN AN ORGANIZED HEALTH CARE EDUCATION/TRAINING PROGRAM
Payer: MEDICAID

## 2021-01-21 DIAGNOSIS — I25.10 CORONARY ARTERY DISEASE INVOLVING NATIVE HEART WITHOUT ANGINA PECTORIS, UNSPECIFIED VESSEL OR LESION TYPE: ICD-10-CM

## 2021-01-21 DIAGNOSIS — I21.11 ST ELEVATION MYOCARDIAL INFARCTION INVOLVING RIGHT CORONARY ARTERY (HCC): ICD-10-CM

## 2021-01-21 DIAGNOSIS — I21.3 ST ELEVATION MYOCARDIAL INFARCTION (STEMI), UNSPECIFIED ARTERY (HCC): ICD-10-CM

## 2021-01-21 LAB
ACT BLD: 346 SEC (ref 74–137)
ALBUMIN SERPL BCP-MCNC: 3.6 G/DL (ref 3.2–4.9)
ALBUMIN/GLOB SERPL: 1.3 G/DL
ALP SERPL-CCNC: 54 U/L (ref 30–99)
ALT SERPL-CCNC: 12 U/L (ref 2–50)
ANION GAP SERPL CALC-SCNC: 12 MMOL/L (ref 7–16)
APTT PPP: >240 SEC (ref 24.7–36)
AST SERPL-CCNC: 17 U/L (ref 12–45)
BASOPHILS # BLD AUTO: 0.3 % (ref 0–1.8)
BASOPHILS # BLD: 0.02 K/UL (ref 0–0.12)
BILIRUB SERPL-MCNC: 0.4 MG/DL (ref 0.1–1.5)
BUN SERPL-MCNC: 17 MG/DL (ref 8–22)
CALCIUM SERPL-MCNC: 8.4 MG/DL (ref 8.5–10.5)
CHLORIDE SERPL-SCNC: 105 MMOL/L (ref 96–112)
CO2 SERPL-SCNC: 19 MMOL/L (ref 20–33)
CREAT SERPL-MCNC: 1.72 MG/DL (ref 0.5–1.4)
EOSINOPHIL # BLD AUTO: 0.06 K/UL (ref 0–0.51)
EOSINOPHIL NFR BLD: 0.8 % (ref 0–6.9)
ERYTHROCYTE [DISTWIDTH] IN BLOOD BY AUTOMATED COUNT: 46.3 FL (ref 35.9–50)
GLOBULIN SER CALC-MCNC: 2.7 G/DL (ref 1.9–3.5)
GLUCOSE SERPL-MCNC: 156 MG/DL (ref 65–99)
HCT VFR BLD AUTO: 41.7 % (ref 42–52)
HGB BLD-MCNC: 14.1 G/DL (ref 14–18)
IMM GRANULOCYTES # BLD AUTO: 0.01 K/UL (ref 0–0.11)
IMM GRANULOCYTES NFR BLD AUTO: 0.1 % (ref 0–0.9)
INR PPP: 1.41 (ref 0.87–1.13)
LIPASE SERPL-CCNC: 60 U/L (ref 11–82)
LYMPHOCYTES # BLD AUTO: 5.14 K/UL (ref 1–4.8)
LYMPHOCYTES NFR BLD: 67.6 % (ref 22–41)
MCH RBC QN AUTO: 28.1 PG (ref 27–33)
MCHC RBC AUTO-ENTMCNC: 33.8 G/DL (ref 33.7–35.3)
MCV RBC AUTO: 83.2 FL (ref 81.4–97.8)
MONOCYTES # BLD AUTO: 0.6 K/UL (ref 0–0.85)
MONOCYTES NFR BLD AUTO: 7.9 % (ref 0–13.4)
NEUTROPHILS # BLD AUTO: 1.77 K/UL (ref 1.82–7.42)
NEUTROPHILS NFR BLD: 23.3 % (ref 44–72)
NRBC # BLD AUTO: 0 K/UL
NRBC BLD-RTO: 0 /100 WBC
PLATELET # BLD AUTO: 186 K/UL (ref 164–446)
PMV BLD AUTO: 12.5 FL (ref 9–12.9)
POTASSIUM SERPL-SCNC: 3.5 MMOL/L (ref 3.6–5.5)
PROT SERPL-MCNC: 6.3 G/DL (ref 6–8.2)
PROTHROMBIN TIME: 17.7 SEC (ref 12–14.6)
RBC # BLD AUTO: 5.01 M/UL (ref 4.7–6.1)
SARS-COV+SARS-COV-2 AG RESP QL IA.RAPID: NOTDETECTED
SODIUM SERPL-SCNC: 136 MMOL/L (ref 135–145)
SPECIMEN SOURCE: NORMAL
TROPONIN T SERPL-MCNC: 35 NG/L (ref 6–19)
WBC # BLD AUTO: 7.6 K/UL (ref 4.8–10.8)

## 2021-01-21 PROCEDURE — 770020 HCHG ROOM/CARE - TELE (206)

## 2021-01-21 PROCEDURE — 87426 SARSCOV CORONAVIRUS AG IA: CPT

## 2021-01-21 PROCEDURE — 99255 IP/OBS CONSLTJ NEW/EST HI 80: CPT | Mod: 25 | Performed by: INTERNAL MEDICINE

## 2021-01-21 PROCEDURE — 80053 COMPREHEN METABOLIC PANEL: CPT

## 2021-01-21 PROCEDURE — 84484 ASSAY OF TROPONIN QUANT: CPT

## 2021-01-21 PROCEDURE — 700101 HCHG RX REV CODE 250

## 2021-01-21 PROCEDURE — 99152 MOD SED SAME PHYS/QHP 5/>YRS: CPT | Performed by: INTERNAL MEDICINE

## 2021-01-21 PROCEDURE — 700102 HCHG RX REV CODE 250 W/ 637 OVERRIDE(OP): Performed by: STUDENT IN AN ORGANIZED HEALTH CARE EDUCATION/TRAINING PROGRAM

## 2021-01-21 PROCEDURE — 99221 1ST HOSP IP/OBS SF/LOW 40: CPT | Performed by: STUDENT IN AN ORGANIZED HEALTH CARE EDUCATION/TRAINING PROGRAM

## 2021-01-21 PROCEDURE — C1887 CATHETER, GUIDING: HCPCS

## 2021-01-21 PROCEDURE — 83690 ASSAY OF LIPASE: CPT

## 2021-01-21 PROCEDURE — 700111 HCHG RX REV CODE 636 W/ 250 OVERRIDE (IP)

## 2021-01-21 PROCEDURE — A9270 NON-COVERED ITEM OR SERVICE: HCPCS | Performed by: INTERNAL MEDICINE

## 2021-01-21 PROCEDURE — 85347 COAGULATION TIME ACTIVATED: CPT

## 2021-01-21 PROCEDURE — 85610 PROTHROMBIN TIME: CPT

## 2021-01-21 PROCEDURE — 93458 L HRT ARTERY/VENTRICLE ANGIO: CPT | Mod: 26,59 | Performed by: INTERNAL MEDICINE

## 2021-01-21 PROCEDURE — A9270 NON-COVERED ITEM OR SERVICE: HCPCS | Performed by: STUDENT IN AN ORGANIZED HEALTH CARE EDUCATION/TRAINING PROGRAM

## 2021-01-21 PROCEDURE — 700101 HCHG RX REV CODE 250: Performed by: STUDENT IN AN ORGANIZED HEALTH CARE EDUCATION/TRAINING PROGRAM

## 2021-01-21 PROCEDURE — A9270 NON-COVERED ITEM OR SERVICE: HCPCS

## 2021-01-21 PROCEDURE — 85025 COMPLETE CBC W/AUTO DIFF WBC: CPT

## 2021-01-21 PROCEDURE — 93005 ELECTROCARDIOGRAM TRACING: CPT | Performed by: INTERNAL MEDICINE

## 2021-01-21 PROCEDURE — 027035Z DILATION OF CORONARY ARTERY, ONE ARTERY WITH TWO DRUG-ELUTING INTRALUMINAL DEVICES, PERCUTANEOUS APPROACH: ICD-10-PCS | Performed by: INTERNAL MEDICINE

## 2021-01-21 PROCEDURE — 99285 EMERGENCY DEPT VISIT HI MDM: CPT

## 2021-01-21 PROCEDURE — 700117 HCHG RX CONTRAST REV CODE 255: Performed by: INTERNAL MEDICINE

## 2021-01-21 PROCEDURE — 4A023N7 MEASUREMENT OF CARDIAC SAMPLING AND PRESSURE, LEFT HEART, PERCUTANEOUS APPROACH: ICD-10-PCS | Performed by: INTERNAL MEDICINE

## 2021-01-21 PROCEDURE — 85730 THROMBOPLASTIN TIME PARTIAL: CPT

## 2021-01-21 PROCEDURE — B2111ZZ FLUOROSCOPY OF MULTIPLE CORONARY ARTERIES USING LOW OSMOLAR CONTRAST: ICD-10-PCS | Performed by: INTERNAL MEDICINE

## 2021-01-21 PROCEDURE — 700102 HCHG RX REV CODE 250 W/ 637 OVERRIDE(OP)

## 2021-01-21 PROCEDURE — 92941 PRQ TRLML REVSC TOT OCCL AMI: CPT | Mod: RC | Performed by: INTERNAL MEDICINE

## 2021-01-21 PROCEDURE — 700102 HCHG RX REV CODE 250 W/ 637 OVERRIDE(OP): Performed by: INTERNAL MEDICINE

## 2021-01-21 RX ORDER — POLYETHYLENE GLYCOL 3350 17 G/17G
1 POWDER, FOR SOLUTION ORAL
Status: DISCONTINUED | OUTPATIENT
Start: 2021-01-21 | End: 2021-01-23 | Stop reason: HOSPADM

## 2021-01-21 RX ORDER — VERAPAMIL HYDROCHLORIDE 2.5 MG/ML
INJECTION, SOLUTION INTRAVENOUS
Status: COMPLETED
Start: 2021-01-21 | End: 2021-01-21

## 2021-01-21 RX ORDER — LISINOPRIL 10 MG/1
10 TABLET ORAL DAILY
Status: DISCONTINUED | OUTPATIENT
Start: 2021-01-22 | End: 2021-01-23

## 2021-01-21 RX ORDER — HEPARIN SODIUM 1000 [USP'U]/ML
INJECTION, SOLUTION INTRAVENOUS; SUBCUTANEOUS
Status: COMPLETED
Start: 2021-01-21 | End: 2021-01-21

## 2021-01-21 RX ORDER — ATORVASTATIN CALCIUM 20 MG/1
80 TABLET, FILM COATED ORAL EVERY EVENING
Status: DISCONTINUED | OUTPATIENT
Start: 2021-01-21 | End: 2021-01-21

## 2021-01-21 RX ORDER — MIDAZOLAM HYDROCHLORIDE 1 MG/ML
INJECTION INTRAMUSCULAR; INTRAVENOUS
Status: COMPLETED
Start: 2021-01-21 | End: 2021-01-21

## 2021-01-21 RX ORDER — PRASUGREL 10 MG/1
60 TABLET, FILM COATED ORAL DAILY
Status: DISCONTINUED | OUTPATIENT
Start: 2021-01-22 | End: 2021-01-21

## 2021-01-21 RX ORDER — AMOXICILLIN 250 MG
2 CAPSULE ORAL 2 TIMES DAILY
Status: DISCONTINUED | OUTPATIENT
Start: 2021-01-22 | End: 2021-01-23 | Stop reason: HOSPADM

## 2021-01-21 RX ORDER — HEPARIN SODIUM 200 [USP'U]/100ML
INJECTION, SOLUTION INTRAVENOUS
Status: COMPLETED
Start: 2021-01-21 | End: 2021-01-21

## 2021-01-21 RX ORDER — PRASUGREL 10 MG/1
60 TABLET, FILM COATED ORAL ONCE
Status: DISCONTINUED | OUTPATIENT
Start: 2021-01-21 | End: 2021-01-21

## 2021-01-21 RX ORDER — FAMOTIDINE 20 MG/1
20 TABLET, FILM COATED ORAL 2 TIMES DAILY
Status: DISCONTINUED | OUTPATIENT
Start: 2021-01-21 | End: 2021-01-21

## 2021-01-21 RX ORDER — NITROGLYCERIN 0.4 MG/1
0.4 TABLET SUBLINGUAL PRN
Status: DISCONTINUED | OUTPATIENT
Start: 2021-01-21 | End: 2021-01-23 | Stop reason: HOSPADM

## 2021-01-21 RX ORDER — BISACODYL 10 MG
10 SUPPOSITORY, RECTAL RECTAL
Status: DISCONTINUED | OUTPATIENT
Start: 2021-01-21 | End: 2021-01-23 | Stop reason: HOSPADM

## 2021-01-21 RX ORDER — PRASUGREL 10 MG/1
10 TABLET, FILM COATED ORAL DAILY
Status: DISCONTINUED | OUTPATIENT
Start: 2021-01-22 | End: 2021-01-23 | Stop reason: HOSPADM

## 2021-01-21 RX ORDER — LIDOCAINE HYDROCHLORIDE 20 MG/ML
INJECTION, SOLUTION INFILTRATION; PERINEURAL
Status: COMPLETED
Start: 2021-01-21 | End: 2021-01-21

## 2021-01-21 RX ORDER — ATORVASTATIN CALCIUM 20 MG/1
80 TABLET, FILM COATED ORAL EVERY EVENING
Status: DISCONTINUED | OUTPATIENT
Start: 2021-01-21 | End: 2021-01-23 | Stop reason: HOSPADM

## 2021-01-21 RX ORDER — ACETAMINOPHEN 325 MG/1
650 TABLET ORAL EVERY 6 HOURS PRN
Status: DISCONTINUED | OUTPATIENT
Start: 2021-01-21 | End: 2021-01-23 | Stop reason: HOSPADM

## 2021-01-21 RX ORDER — POTASSIUM CHLORIDE 20 MEQ/1
40 TABLET, EXTENDED RELEASE ORAL ONCE
Status: COMPLETED | OUTPATIENT
Start: 2021-01-21 | End: 2021-01-21

## 2021-01-21 RX ORDER — LABETALOL HYDROCHLORIDE 5 MG/ML
10 INJECTION, SOLUTION INTRAVENOUS EVERY 4 HOURS PRN
Status: DISCONTINUED | OUTPATIENT
Start: 2021-01-21 | End: 2021-01-23 | Stop reason: HOSPADM

## 2021-01-21 RX ORDER — PRASUGREL 10 MG/1
TABLET, FILM COATED ORAL
Status: COMPLETED
Start: 2021-01-21 | End: 2021-01-21

## 2021-01-21 RX ADMIN — ASPIRIN 81 MG: 81 TABLET, COATED ORAL at 20:21

## 2021-01-21 RX ADMIN — MIDAZOLAM HYDROCHLORIDE 2 MG: 1 INJECTION, SOLUTION INTRAMUSCULAR; INTRAVENOUS at 17:08

## 2021-01-21 RX ADMIN — MIDAZOLAM HYDROCHLORIDE 1 MG: 1 INJECTION, SOLUTION INTRAMUSCULAR; INTRAVENOUS at 17:09

## 2021-01-21 RX ADMIN — LABETALOL HYDROCHLORIDE 10 MG: 5 INJECTION, SOLUTION INTRAVENOUS at 22:12

## 2021-01-21 RX ADMIN — LIDOCAINE HYDROCHLORIDE: 20 INJECTION, SOLUTION INFILTRATION; PERINEURAL at 17:07

## 2021-01-21 RX ADMIN — LABETALOL HYDROCHLORIDE 10 MG: 5 INJECTION, SOLUTION INTRAVENOUS at 23:55

## 2021-01-21 RX ADMIN — FENTANYL CITRATE 100 MCG: 50 INJECTION, SOLUTION INTRAMUSCULAR; INTRAVENOUS at 17:08

## 2021-01-21 RX ADMIN — HEPARIN SODIUM: 1000 INJECTION, SOLUTION INTRAVENOUS; SUBCUTANEOUS at 16:45

## 2021-01-21 RX ADMIN — POTASSIUM CHLORIDE 40 MEQ: 1500 TABLET, EXTENDED RELEASE ORAL at 20:21

## 2021-01-21 RX ADMIN — VERAPAMIL HYDROCHLORIDE 2.5 MG: 2.5 INJECTION, SOLUTION INTRAVENOUS at 17:08

## 2021-01-21 RX ADMIN — IOHEXOL 75 ML: 350 INJECTION, SOLUTION INTRAVENOUS at 17:10

## 2021-01-21 RX ADMIN — NITROGLYCERIN 10 ML: 20 INJECTION INTRAVENOUS at 17:07

## 2021-01-21 RX ADMIN — PRASUGREL 60 MG: 10 TABLET, FILM COATED ORAL at 17:22

## 2021-01-21 RX ADMIN — ATORVASTATIN CALCIUM 80 MG: 20 TABLET, FILM COATED ORAL at 20:20

## 2021-01-21 RX ADMIN — HEPARIN SODIUM 2000 UNITS: 200 INJECTION, SOLUTION INTRAVENOUS at 17:08

## 2021-01-21 RX ADMIN — METOPROLOL TARTRATE 12.5 MG: 25 TABLET, FILM COATED ORAL at 20:21

## 2021-01-21 ASSESSMENT — ENCOUNTER SYMPTOMS
MUSCULOSKELETAL NEGATIVE: 1
CONSTITUTIONAL NEGATIVE: 1
NEUROLOGICAL NEGATIVE: 1
RESPIRATORY NEGATIVE: 1
PSYCHIATRIC NEGATIVE: 1
GASTROINTESTINAL NEGATIVE: 1
CARDIOVASCULAR NEGATIVE: 1
EYES NEGATIVE: 1

## 2021-01-21 ASSESSMENT — FIBROSIS 4 INDEX: FIB4 SCORE: 0.94

## 2021-01-21 ASSESSMENT — PAIN DESCRIPTION - PAIN TYPE: TYPE: ACUTE PAIN

## 2021-01-22 PROBLEM — N17.9 AKI (ACUTE KIDNEY INJURY) (HCC): Status: ACTIVE | Noted: 2021-01-22

## 2021-01-22 LAB
ALBUMIN SERPL BCP-MCNC: 3.8 G/DL (ref 3.2–4.9)
ALBUMIN/GLOB SERPL: 1.3 G/DL
ALP SERPL-CCNC: 63 U/L (ref 30–99)
ALT SERPL-CCNC: 13 U/L (ref 2–50)
AMPHET UR QL SCN: POSITIVE
ANION GAP SERPL CALC-SCNC: 13 MMOL/L (ref 7–16)
AST SERPL-CCNC: 34 U/L (ref 12–45)
BARBITURATES UR QL SCN: NEGATIVE
BASOPHILS # BLD AUTO: 0.4 % (ref 0–1.8)
BASOPHILS # BLD: 0.03 K/UL (ref 0–0.12)
BENZODIAZ UR QL SCN: POSITIVE
BILIRUB SERPL-MCNC: 0.3 MG/DL (ref 0.1–1.5)
BUN SERPL-MCNC: 17 MG/DL (ref 8–22)
BZE UR QL SCN: NEGATIVE
CALCIUM SERPL-MCNC: 8.9 MG/DL (ref 8.5–10.5)
CANNABINOIDS UR QL SCN: POSITIVE
CHLORIDE SERPL-SCNC: 96 MMOL/L (ref 96–112)
CHOLEST SERPL-MCNC: 148 MG/DL (ref 100–199)
CO2 SERPL-SCNC: 22 MMOL/L (ref 20–33)
CREAT SERPL-MCNC: 1.49 MG/DL (ref 0.5–1.4)
EKG IMPRESSION: NORMAL
EOSINOPHIL # BLD AUTO: 0.03 K/UL (ref 0–0.51)
EOSINOPHIL NFR BLD: 0.4 % (ref 0–6.9)
ERYTHROCYTE [DISTWIDTH] IN BLOOD BY AUTOMATED COUNT: 45.3 FL (ref 35.9–50)
EST. AVERAGE GLUCOSE BLD GHB EST-MCNC: 117 MG/DL
GLOBULIN SER CALC-MCNC: 2.9 G/DL (ref 1.9–3.5)
GLUCOSE SERPL-MCNC: 94 MG/DL (ref 65–99)
HBA1C MFR BLD: 5.7 % (ref 0–5.6)
HCT VFR BLD AUTO: 41.2 % (ref 42–52)
HDLC SERPL-MCNC: 38 MG/DL
HGB BLD-MCNC: 13.8 G/DL (ref 14–18)
IMM GRANULOCYTES # BLD AUTO: 0.01 K/UL (ref 0–0.11)
IMM GRANULOCYTES NFR BLD AUTO: 0.1 % (ref 0–0.9)
LDLC SERPL CALC-MCNC: 83 MG/DL
LYMPHOCYTES # BLD AUTO: 2.91 K/UL (ref 1–4.8)
LYMPHOCYTES NFR BLD: 40.4 % (ref 22–41)
MCH RBC QN AUTO: 27.8 PG (ref 27–33)
MCHC RBC AUTO-ENTMCNC: 33.5 G/DL (ref 33.7–35.3)
MCV RBC AUTO: 83.1 FL (ref 81.4–97.8)
METHADONE UR QL SCN: NEGATIVE
MONOCYTES # BLD AUTO: 0.41 K/UL (ref 0–0.85)
MONOCYTES NFR BLD AUTO: 5.7 % (ref 0–13.4)
NEUTROPHILS # BLD AUTO: 3.82 K/UL (ref 1.82–7.42)
NEUTROPHILS NFR BLD: 53 % (ref 44–72)
NRBC # BLD AUTO: 0 K/UL
NRBC BLD-RTO: 0 /100 WBC
OPIATES UR QL SCN: NEGATIVE
OXYCODONE UR QL SCN: NEGATIVE
PCP UR QL SCN: NEGATIVE
PLATELET # BLD AUTO: 159 K/UL (ref 164–446)
PMV BLD AUTO: 11.6 FL (ref 9–12.9)
POTASSIUM SERPL-SCNC: 3.8 MMOL/L (ref 3.6–5.5)
PROPOXYPH UR QL SCN: NEGATIVE
PROT SERPL-MCNC: 6.7 G/DL (ref 6–8.2)
RBC # BLD AUTO: 4.96 M/UL (ref 4.7–6.1)
SODIUM SERPL-SCNC: 131 MMOL/L (ref 135–145)
TRIGL SERPL-MCNC: 137 MG/DL (ref 0–149)
WBC # BLD AUTO: 7.2 K/UL (ref 4.8–10.8)

## 2021-01-22 PROCEDURE — 80061 LIPID PANEL: CPT

## 2021-01-22 PROCEDURE — 700102 HCHG RX REV CODE 250 W/ 637 OVERRIDE(OP): Performed by: STUDENT IN AN ORGANIZED HEALTH CARE EDUCATION/TRAINING PROGRAM

## 2021-01-22 PROCEDURE — A9270 NON-COVERED ITEM OR SERVICE: HCPCS

## 2021-01-22 PROCEDURE — A9270 NON-COVERED ITEM OR SERVICE: HCPCS | Performed by: STUDENT IN AN ORGANIZED HEALTH CARE EDUCATION/TRAINING PROGRAM

## 2021-01-22 PROCEDURE — 770020 HCHG ROOM/CARE - TELE (206)

## 2021-01-22 PROCEDURE — 97161 PT EVAL LOW COMPLEX 20 MIN: CPT

## 2021-01-22 PROCEDURE — 36415 COLL VENOUS BLD VENIPUNCTURE: CPT

## 2021-01-22 PROCEDURE — 700102 HCHG RX REV CODE 250 W/ 637 OVERRIDE(OP): Performed by: NURSE PRACTITIONER

## 2021-01-22 PROCEDURE — 99233 SBSQ HOSP IP/OBS HIGH 50: CPT | Performed by: INTERNAL MEDICINE

## 2021-01-22 PROCEDURE — 83036 HEMOGLOBIN GLYCOSYLATED A1C: CPT

## 2021-01-22 PROCEDURE — 99232 SBSQ HOSP IP/OBS MODERATE 35: CPT | Mod: GC | Performed by: INTERNAL MEDICINE

## 2021-01-22 PROCEDURE — 80307 DRUG TEST PRSMV CHEM ANLYZR: CPT

## 2021-01-22 PROCEDURE — A9270 NON-COVERED ITEM OR SERVICE: HCPCS | Performed by: INTERNAL MEDICINE

## 2021-01-22 PROCEDURE — A9270 NON-COVERED ITEM OR SERVICE: HCPCS | Performed by: NURSE PRACTITIONER

## 2021-01-22 PROCEDURE — 93010 ELECTROCARDIOGRAM REPORT: CPT | Performed by: INTERNAL MEDICINE

## 2021-01-22 PROCEDURE — 80053 COMPREHEN METABOLIC PANEL: CPT

## 2021-01-22 PROCEDURE — 700102 HCHG RX REV CODE 250 W/ 637 OVERRIDE(OP): Performed by: INTERNAL MEDICINE

## 2021-01-22 PROCEDURE — 85025 COMPLETE CBC W/AUTO DIFF WBC: CPT

## 2021-01-22 PROCEDURE — 700102 HCHG RX REV CODE 250 W/ 637 OVERRIDE(OP)

## 2021-01-22 RX ORDER — PRASUGREL 10 MG/1
TABLET, FILM COATED ORAL
Status: COMPLETED
Start: 2021-01-22 | End: 2021-01-22

## 2021-01-22 RX ORDER — NIFEDIPINE 30 MG/1
30 TABLET, EXTENDED RELEASE ORAL
Status: DISCONTINUED | OUTPATIENT
Start: 2021-01-22 | End: 2021-01-23

## 2021-01-22 RX ORDER — POTASSIUM CHLORIDE 20 MEQ/1
40 TABLET, EXTENDED RELEASE ORAL ONCE
Status: COMPLETED | OUTPATIENT
Start: 2021-01-22 | End: 2021-01-22

## 2021-01-22 RX ORDER — LISINOPRIL 10 MG/1
TABLET ORAL
Status: COMPLETED
Start: 2021-01-22 | End: 2021-01-22

## 2021-01-22 RX ADMIN — METOPROLOL TARTRATE 25 MG: 25 TABLET, FILM COATED ORAL at 17:33

## 2021-01-22 RX ADMIN — NIFEDIPINE 30 MG: 30 TABLET, EXTENDED RELEASE ORAL at 16:18

## 2021-01-22 RX ADMIN — LISINOPRIL 10 MG: 10 TABLET ORAL at 04:50

## 2021-01-22 RX ADMIN — POTASSIUM CHLORIDE 40 MEQ: 1500 TABLET, EXTENDED RELEASE ORAL at 09:21

## 2021-01-22 RX ADMIN — METOPROLOL TARTRATE 25 MG: 25 TABLET, FILM COATED ORAL at 09:24

## 2021-01-22 RX ADMIN — PRASUGREL 10 MG: 10 TABLET, FILM COATED ORAL at 04:50

## 2021-01-22 RX ADMIN — ATORVASTATIN CALCIUM 80 MG: 20 TABLET, FILM COATED ORAL at 17:33

## 2021-01-22 RX ADMIN — METOPROLOL TARTRATE 12.5 MG: 25 TABLET, FILM COATED ORAL at 04:50

## 2021-01-22 RX ADMIN — DOCUSATE SODIUM 50 MG AND SENNOSIDES 8.6 MG 2 TABLET: 8.6; 5 TABLET, FILM COATED ORAL at 17:33

## 2021-01-22 ASSESSMENT — ENCOUNTER SYMPTOMS
DEPRESSION: 0
CHEST TIGHTNESS: 0
COUGH: 0
BLURRED VISION: 0
PALPITATIONS: 0
WHEEZING: 0
SPUTUM PRODUCTION: 0
VOMITING: 0
NAUSEA: 0
SENSORY CHANGE: 0
STRIDOR: 0
DIARRHEA: 0
CHILLS: 0
TREMORS: 0
ABDOMINAL PAIN: 0
DOUBLE VISION: 0
HEMOPTYSIS: 0
MYALGIAS: 0
DIZZINESS: 0
APNEA: 0
CHOKING: 0
FEVER: 0
SHORTNESS OF BREATH: 0

## 2021-01-22 ASSESSMENT — COGNITIVE AND FUNCTIONAL STATUS - GENERAL
MOBILITY SCORE: 24
MOBILITY SCORE: 24
SUGGESTED CMS G CODE MODIFIER MOBILITY: CH
SUGGESTED CMS G CODE MODIFIER MOBILITY: CH
SUGGESTED CMS G CODE MODIFIER DAILY ACTIVITY: CH
DAILY ACTIVITIY SCORE: 24

## 2021-01-22 ASSESSMENT — GAIT ASSESSMENTS
GAIT LEVEL OF ASSIST: SUPERVISED
DEVIATION: NO DEVIATION
DISTANCE (FEET): 600

## 2021-01-22 ASSESSMENT — PAIN DESCRIPTION - PAIN TYPE
TYPE: ACUTE PAIN
TYPE: ACUTE PAIN

## 2021-01-22 ASSESSMENT — FIBROSIS 4 INDEX: FIB4 SCORE: 2.73

## 2021-01-22 NOTE — DISCHARGE PLANNING
RN CM notified in AM rounds that this patient will likely DC today, no needs other than [id number]. Per chart review of assessment, patient was uninsured but has since been insured under Medicaid HMO. Following 1045 rounds, RN CM will attempt to speak to the patient to identify any dc needs.    RN CM called [id number] and they stated they have not yet received prescriptions. RN CM volated Dr. Doan regarding need to send prescriptions to [facility], RN CM will follow up     1226- RN CM followed up with [facility], still pending prescriptions. RN CM volated Dr. Doan to notify. RN CM to continue to follow up.     1231- Per Dr. Doan, patient is not cleared to DC tomorrow, Dr. Doan to send medications to [id number] tomorrow, as medications might changed. Handoff report updated.

## 2021-01-22 NOTE — PROGRESS NOTES
12 Hour CC     Monitor Summary:     SR 77-97    (F) PACs, PVC Coup     Measurements:     0.14/ 0.10/0.38

## 2021-01-22 NOTE — NON-PROVIDER
"*Practice Medical Student Note*    ID: Mr. Immanuel Borges is a 44 y/o M who presented to the ED via EMS with chest pain. ECG revealed inferior STEMI, and he was emergently taken to the cath lab which showed 100% in-stent occlusion of the right coronary artery. Two drug eluting stents were placed at the proximal RCA.     Interval Events  Mr. Borges has remained very hypertensive with highest reading coming in at 182/130. Last reading before rounds was 178/117. This is accompanied with tachycardia. Labs also reveal HAYDEE.     Subjective  Mr. Borges says he feels a lot better this morning and denies chest pain, dizziness, weakness, leg swelling, shortness of breath, N/V, headache, and numbness and tingling. He only complains of a slightly runny nose. Upon speaking with Mr. Borges, he revealed a strong family cardiac history which could contribute to his second MI within a year along with poor adherence to medications after first stent placement in April 2020.     ROS  *See HPI*  GI: passing flatus, has had a BM  : urinating just fine, denies dysuria    PMH  Stent placement for RCA STEMI April 2020  HTN    Fam Hx  \"Heart issues\" in father, sister    Social Hx  Tobacco  Methamphetamine use    Objective  Vitals T: 98.6 P: 78 Resp: 16 O2: 99% BP: 178/117    Physical Exam  General: Well appearing 44 y/o M who appears stated age  Cardiovascular: Normal S1, S2, regular rate and rhythm  Pulmonary: clear to auscultation bilaterally   Extremities: no peripheral edema     Labs  -Anemia  -Thrombocytopenia  -Hyponatremia  -Creatinine elevated, GFR decreased (change from normal values in October 2020)  -INR, PT, APTT, ISTAT elevated  -UDS: positive for benzodiazepines, cannabinoids, and amphetamines    Assessment/Plan  Mr. Borges is a 44 y/o M w/ a PMH of HTN, RCA stent placement, and previous MI who was admitted and treated for an inferior STEMI. Two drug eluting stents were emergently placed in the RCA. Patient reports feeling better from " yesterday. He has been hypertensive since admission to the telemetry floor. Labs reveal HAYDEE.      #Inferior STEMI secondary to 100% RCA occlusion  -Cardiology monitoring; 2 drug eluting stents placed in proximal RCA   -Place on atorvastatin, aspirin, prasugrel, and metoprolol therapy  -Educate patient on importance of outpatient adherence  -Consult OT/PT    #HTN  -Increase metoprolol dose to 50mg/day  -Add Ca+ channel blocker (nifedipine)     #HAYDEE  -Prerenal due to decreased perfusion most likely; contrast from Cath procedure could also worsen it  -Monitor BMP    #Hypokalemia  -Replenish until therapeutic goal of 4 is attained    #Methamphetamine Use  - on cessation   -Educate patient on cardiac risks       Note Written by Rhonda Coffman MS3  Attending Physician Dr. Samy Ryan

## 2021-01-22 NOTE — PROGRESS NOTES
Received report from Cath lab RN. Pt is A&O x 4. Pt transported via [facility] with ACLS RN and zoll monitor. Pt arrived to unit, tele box on patient, confirmed with monitor room SR 94. Pt ambulated steadily with standby assist from rney to hospital bed, tolerated well. Pt oriented to unit and call light, verbalized understanding. Fall precautions in place. Call light and bedside table within reach, bed locked in lowest position with controls on. Will continue to monitor.

## 2021-01-22 NOTE — PROGRESS NOTES
Assumed care at 2115. Bedside report received from Night RN . Patient's chart and MAR reviewed. 12 hour chart check complete. Assessment complete, pt 0/10 pain at this time. Pt is awake in BED. Pt is A & O x 4. Patient was updated on plan of care for the day. Questions answered and concerns addressed.  Pt denies any additional needs at this time. White board updated. Call light, phone and personal belongings within reach. Bed alarm on and working appropriately. Vital signs stable.

## 2021-01-22 NOTE — H&P
Hospital Medicine History & Physical Note    Date of Service  1/21/2021    Primary Care Physician  No primary care provider on file.    Consultants  Cardiology  Interventional cardiology    Code Status  Full Code    Chief Complaint  Chest pain    History of Presenting Illness  45 y.o. male with past medical history of STEMI and RCA months ago and methamphetamine abuse presents emergency department on 1/21/2021 via EMS for oppressive substernal chest pain and noted for inferior STEMI in the by EMS. Patient was transported emergently to the Cath Lab where he was noted for RCA stent thrombosis. 2 drug-eluting stents were placed at the proximal RCA. At telemetry, stable VS and patient saturating well on RA. CBC nl. Chem w/ hypoK. He was admitted to telemetry for post STEMI care.     Review of Systems  Review of Systems   Constitutional: Negative.    HENT: Negative.    Eyes: Negative.    Respiratory: Negative.    Cardiovascular: Negative.    Gastrointestinal: Negative.    Genitourinary: Negative.    Musculoskeletal: Negative.    Skin: Negative.    Neurological: Negative.    Endo/Heme/Allergies: Negative.    Psychiatric/Behavioral: Negative.        Past Medical History   has a past medical history of STEMI (ST elevation myocardial infarction) ([facility]).    Surgical History   has no past surgical history on file.     Family History  family history is not on file.     Social History   reports that he has been smoking cigarettes. He has never used smokeless tobacco. He reports previous alcohol use. He reports current drug use.    Allergies  No Known Allergies    Medications  Prior to Admission Medications   Prescriptions Last Dose Informant Patient Reported? Taking?   aspirin 81 MG EC tablet   No No   Sig: Take 1 Tab by mouth every day.   atorvastatin (LIPITOR) 80 MG tablet   No No   Sig: Take 1 Tab by mouth every evening.   famotidine (PEPCID) 20 MG Tab   No No   Sig: Take 1 Tab by mouth 2 times a day.   lisinopril (PRINIVIL)  10 MG Tab   No No   Sig: Take 1 Tab by mouth every day.   metoprolol (LOPRESSOR) 25 MG Tab   No No   Sig: Take 0.5 Tabs by mouth 2 Times a Day.   nitroglycerin (NITROSTAT) 0.4 MG SL Tab   No No   Sig: Place 1 Tab under tongue as needed for Chest Pain (up to 3 doses (if SBP greater than 90 mmHg)).   prasugrel (EFFIENT) 10 MG Tab   No No   Sig: Take 1 Tab by mouth every day.      Facility-Administered Medications: None       Physical Exam       Physical Exam  Constitutional:       General: He is not in acute distress.     Appearance: Normal appearance.   HENT:      Head: Normocephalic and atraumatic.      Nose: Nose normal. No congestion.      Mouth/Throat:      Mouth: Mucous membranes are moist.   Eyes:      Extraocular Movements: Extraocular movements intact.      Pupils: Pupils are equal, round, and reactive to light.   Neck:      Musculoskeletal: Normal range of motion and neck supple.   Cardiovascular:      Rate and Rhythm: Normal rate and regular rhythm.      Pulses: Normal pulses.      Heart sounds: Normal heart sounds.   Pulmonary:      Effort: Pulmonary effort is normal.      Breath sounds: Normal breath sounds.   Abdominal:      General: Bowel sounds are normal.      Palpations: Abdomen is soft.      Tenderness: There is no abdominal tenderness.   Musculoskeletal: Normal range of motion.         General: No swelling.   Skin:     General: Skin is warm.      Coloration: Skin is not jaundiced.   Neurological:      General: No focal deficit present.      Mental Status: He is alert and oriented to person, place, and time. Mental status is at baseline.      Cranial Nerves: No cranial nerve deficit.   Psychiatric:         Mood and Affect: Mood normal.         Behavior: Behavior normal.         Thought Content: Thought content normal.         Judgment: Judgment normal.         Laboratory:  Recent Labs     01/21/21  1645   WBC 7.6   RBC 5.01   HEMOGLOBIN 14.1   HEMATOCRIT 41.7*   MCV 83.2   MCH 28.1   MCHC 33.8   RDW  46.3   PLATELETCT 186   MPV 12.5     Recent Labs     01/21/21  1645   SODIUM 136   POTASSIUM 3.5*   CHLORIDE 105   CO2 19*   GLUCOSE 156*   BUN 17   CREATININE 1.72*   CALCIUM 8.4*     Recent Labs     01/21/21  1645   ALTSGPT 12   ASTSGOT 17   ALKPHOSPHAT 54   TBILIRUBIN 0.4   LIPASE 60   GLUCOSE 156*         No results for input(s): NTPROBNP in the last 72 hours.      Recent Labs     01/21/21  1645   TROPONINT 35*       Imaging:  CL-LEFT HEART CATHETERIZATION WITH POSSIBLE INTERVENTION    (Results Pending)         Assessment/Plan:  I anticipate this patient will require at least two midnights for appropriate medical management, necessitating inpatient admission.    * STEMI (ST elevation myocardial infarction) (HCC)  Assessment & Plan  History of RCA stent placement months ago  Noted for STEMI on the field and again at the ED  Patient underwent emergent catheterization  He received 2 drug-eluting stents at the proximal RCA  Admit to telemetry  Received verapamil, prasugrel and heparin bolus after cath  Dual antiplatelet therapy  High-dose statin  Labs in a.m.    Essential hypertension- (present on admission)  Assessment & Plan  Continue home lisinopril    Other hyperlipidemia- (present on admission)  Assessment & Plan  Continue high-dose statin    Hypokalemia  Assessment & Plan  Replete as necessary     DVT prophylaxis therapeutic heparin

## 2021-01-22 NOTE — ED PROVIDER NOTES
"ED Provider Note    CHIEF COMPLAINT  Chest pain, prehospital STEMI    HPI  Immanuel Borges is a 45 y.o. male who presents for evaluation of chest pain that began shortly prior to he called EMS, a pressure-like sensation across his chest consistent with symptoms he experienced with a prior STEMI.  The patient states that he did use some methamphetamine.  He has been on a statin and lisinopril since his prior STEMI.  The patient did use an erectile dysfunction medication last night and took a nitro just shortly prior to the arrival of EMS.  He has ongoing pressure.  Shortness of breath.  No leg pain or swelling.  No abdominal pain.  No vomiting.  He reports diaphoresis.    REVIEW OF SYSTEMS  Negative for fever, rash, vomiting, diarrhea, headache, focal weakness, focal numbness, focal tingling, back pain. All other systems are negative.     PAST MEDICAL HISTORY  Past Medical History:   Diagnosis Date   • STEMI (ST elevation myocardial infarction) (HCC)        FAMILY HISTORY  No family history on file.    SOCIAL HISTORY  Social History     Tobacco Use   • Smoking status: Light Tobacco Smoker     Types: Cigarettes   • Smokeless tobacco: Never Used   Substance Use Topics   • Alcohol use: Not Currently   • Drug use: Yes     Comment: marijuana       SURGICAL HISTORY  No past surgical history on file.    CURRENT MEDICATIONS  I personally reviewed the medication list in the charting documentation.     ALLERGIES  No Known Allergies    MEDICAL RECORD  I have reviewed patient's medical record and pertinent results are listed above.      PHYSICAL EXAM  VITAL SIGNS: /103   Pulse 86   Temp 36.9 °C (98.4 °F) (Temporal)   Resp 17   Ht 1.702 m (5' 7\")   Wt 70.8 kg (156 lb)   SpO2 98%   BMI 24.43 kg/m²   Constitutional: Acutely ill-appearing, diaphoretic  HENT: Normocephalic, no evidence of acute trauma.  Eyes: No scleral icterus. Normal conjunctiva   Neck: Supple, comfortable, nonpainful range of motion.   Cardiovascular: " Regular heart rate and rhythm.   Thorax & Lungs: Chest is nontender.  Lungs are clear to auscultation with good air movement bilaterally.  No wheeze, rhonchi, nor rales.   Abdomen: Soft, with no tenderness, rebound nor guarding.  No mass or pulsatile mass appreciated.  Skin: Warm, dry. No rash appreciated  Extremities/Musculoskeletal: No sign of trauma. No asymmetric calf tenderness, erythema or edema. Normal range of motion   Neurologic: Seems somnolent but easily arousable to verbal stimulation, once he is awake he is alert and follows commands appropriately  Psychiatric: Normal affect appropriate for the clinical situation.    DIAGNOSTIC STUDIES / PROCEDURES    LABS/EKGs  Results for orders placed or performed during the hospital encounter of 01/21/21   CBC WITH DIFFERENTIAL   Result Value Ref Range    WBC 7.6 4.8 - 10.8 K/uL    RBC 5.01 4.70 - 6.10 M/uL    Hemoglobin 14.1 14.0 - 18.0 g/dL    Hematocrit 41.7 (L) 42.0 - 52.0 %    MCV 83.2 81.4 - 97.8 fL    MCH 28.1 27.0 - 33.0 pg    MCHC 33.8 33.7 - 35.3 g/dL    RDW 46.3 35.9 - 50.0 fL    Platelet Count 186 164 - 446 K/uL    MPV 12.5 9.0 - 12.9 fL    Neutrophils-Polys 23.30 (L) 44.00 - 72.00 %    Lymphocytes 67.60 (H) 22.00 - 41.00 %    Monocytes 7.90 0.00 - 13.40 %    Eosinophils 0.80 0.00 - 6.90 %    Basophils 0.30 0.00 - 1.80 %    Immature Granulocytes 0.10 0.00 - 0.90 %    Nucleated RBC 0.00 /100 WBC    Neutrophils (Absolute) 1.77 (L) 1.82 - 7.42 K/uL    Lymphs (Absolute) 5.14 (H) 1.00 - 4.80 K/uL    Monos (Absolute) 0.60 0.00 - 0.85 K/uL    Eos (Absolute) 0.06 0.00 - 0.51 K/uL    Baso (Absolute) 0.02 0.00 - 0.12 K/uL    Immature Granulocytes (abs) 0.01 0.00 - 0.11 K/uL    NRBC (Absolute) 0.00 K/uL   COMP METABOLIC PANEL   Result Value Ref Range    Sodium 136 135 - 145 mmol/L    Potassium 3.5 (L) 3.6 - 5.5 mmol/L    Chloride 105 96 - 112 mmol/L    Co2 19 (L) 20 - 33 mmol/L    Anion Gap 12.0 7.0 - 16.0    Glucose 156 (H) 65 - 99 mg/dL    Bun 17 8 - 22 mg/dL     Creatinine 1.72 (H) 0.50 - 1.40 mg/dL    Calcium 8.4 (L) 8.5 - 10.5 mg/dL    AST(SGOT) 17 12 - 45 U/L    ALT(SGPT) 12 2 - 50 U/L    Alkaline Phosphatase 54 30 - 99 U/L    Total Bilirubin 0.4 0.1 - 1.5 mg/dL    Albumin 3.6 3.2 - 4.9 g/dL    Total Protein 6.3 6.0 - 8.2 g/dL    Globulin 2.7 1.9 - 3.5 g/dL    A-G Ratio 1.3 g/dL   TROPONIN   Result Value Ref Range    Troponin T 35 (H) 6 - 19 ng/L   LIPASE   Result Value Ref Range    Lipase 60 11 - 82 U/L   APTT   Result Value Ref Range    APTT >240.0 (HH) 24.7 - 36.0 sec   PROTHROMBIN TIME (INR)   Result Value Ref Range    PT 17.7 (H) 12.0 - 14.6 sec    INR 1.41 (H) 0.87 - 1.13   SARS-COV Antigen EVETTE: Collect dry nasal swab AND NP swab in VTM   Result Value Ref Range    SARS-CoV-2 Source Nasal Swab     SARS-COV ANTIGEN EVETTE NotDetected Not-Detected   ESTIMATED GFR   Result Value Ref Range    GFR If  52 (A) >60 mL/min/1.73 m 2    GFR If Non  43 (A) >60 mL/min/1.73 m 2   POC ACT (resulted by nursing)   Result Value Ref Range    Istat Activated Clotting Time 346 (H) 74 - 137 sec                             12 Lead EKG interpreted by me to show:    Rate 56  Rhythm: Normal sinus rhythm  Axis: Normal  KS and QRS Intervals: Normal  T waves: Inversions leads V3, V4  ST segments: Elevation in leads II, 3, aVF.  Depression V2, V3  Ectopy: None.    My impression of this EKG: Inferior STEMI    RADIOLOGY  CL-LEFT HEART CATHETERIZATION WITH POSSIBLE INTERVENTION    (Results Pending)         COURSE & MEDICAL DECISION MAKING  I have reviewed any medical record information, laboratory studies and radiographic results as noted above.    Encounter Summary: This is a 45 y.o. male with chest pain, was a prehospital STEMI alert, he arrives with an EKG revealing an inferior STEMI.  I evaluated him emergently in the resuscitation bay alongside Dr. King, cardiologist.  The patient underwent brief evaluation including EKG, blood drawl and prepped for the Cath Lab.   He was then transported emergently to the Cath Lab for definitive intervention.     CRITICAL CARE  The very real possibilty of a deterioration of this patient's condition required the highest level of my preparedness for sudden, emergent intervention.  I provided critical care services, which included medication orders, frequent reevaluations of the patient's condition and response to treatment, ordering and reviewing test results, and discussing the case with various consultants.  The critical care time associated with the care of the patient was 31 minutes. Review chart for interventions. This time is exclusive of any other billable procedures.       DISPOSITION: Admit in critical condition      FINAL IMPRESSION  1. ST elevation myocardial infarction (STEMI), unspecified artery (HCC)           This dictation was created using voice recognition software. The accuracy of the dictation is limited to the abilities of the software. I expect there may be some errors of grammar and possibly content. The nursing notes were reviewed and certain aspects of this information were incorporated into this note.    Electronically signed by: Emory Cotto M.D., 1/21/2021 4:43 PM

## 2021-01-22 NOTE — ASSESSMENT & PLAN NOTE
On admission BUN 17, creatinine 1.72.  Previous BUN 9, creatinine 1.18.  Likely prerenal can due to decreased perfusion in the setting of myocardial infarction.  Kidney function improved to BUN 17, creatinine 1.49   Continue to monitor with daily BMP.

## 2021-01-22 NOTE — PROGRESS NOTES
Cardiology Follow Up Progress Note    Date of Service  1/22/2021    Attending Physician  ERYN Anderson*    Past medical history significant for RCA STEMI status post successful PCI (4/18/2020), chronic tobacco use, methamphetamine use      Presented with chest pain    Cardiology consultation for RCA STEMI    Interim Events    No overnight cardiac events  Monitor rhythm: Sinus rhythm  No recurrent chest discomfort  Hypertensive this morning required as needed labetalol in addition to routine blood pressure meds  Labs reviewed  HAYDEE, improving  Replete potassium  U tox positive for benzodiazepines, cannabinoid, amphetamine      Review of Systems  Review of Systems   Respiratory: Negative for apnea, cough, choking, chest tightness, shortness of breath, wheezing and stridor.        Vital signs in last 24 hours  Temp:  [36.3 °C (97.3 °F)-37 °C (98.6 °F)] 37 °C (98.6 °F)  Pulse:  [] 80  Resp:  [16-18] 16  BP: (144-182)/() 152/103  SpO2:  [96 %-100 %] 100 %    Physical Exam  Physical Exam  Cardiovascular:      Rate and Rhythm: Normal rate and regular rhythm.      Pulses: Normal pulses.   Pulmonary:      Effort: Pulmonary effort is normal.   Skin:     General: Skin is warm.      Comments: Right radial cath site without evidence of hematoma   Neurological:      General: No focal deficit present.      Mental Status: He is alert.   Psychiatric:         Mood and Affect: Mood normal.         Lab Review  Lab Results   Component Value Date/Time    WBC 7.2 01/22/2021 02:19 AM    RBC 4.96 01/22/2021 02:19 AM    HEMOGLOBIN 13.8 (L) 01/22/2021 02:19 AM    HEMATOCRIT 41.2 (L) 01/22/2021 02:19 AM    MCV 83.1 01/22/2021 02:19 AM    MCH 27.8 01/22/2021 02:19 AM    MCHC 33.5 (L) 01/22/2021 02:19 AM    MPV 11.6 01/22/2021 02:19 AM      Lab Results   Component Value Date/Time    SODIUM 131 (L) 01/22/2021 02:19 AM    POTASSIUM 3.8 01/22/2021 02:19 AM    CHLORIDE 96 01/22/2021 02:19 AM    CO2 22 01/22/2021 02:19 AM     GLUCOSE 94 01/22/2021 02:19 AM    BUN 17 01/22/2021 02:19 AM    CREATININE 1.49 (H) 01/22/2021 02:19 AM      Lab Results   Component Value Date/Time    ASTSGOT 34 01/22/2021 02:19 AM    ALTSGPT 13 01/22/2021 02:19 AM     Lab Results   Component Value Date/Time    CHOLSTRLTOT 128 04/19/2020 03:50 AM    LDL 65 04/19/2020 03:50 AM    HDL 37 (A) 04/19/2020 03:50 AM    TRIGLYCERIDE 130 04/19/2020 03:50 AM    TROPONINT 35 (H) 01/21/2021 04:45 PM       No results for input(s): NTPROBNP in the last 72 hours.    Cardiac Imaging and Procedures Review  EKG: Inferior MI    Echocardiogram:    4/18/2020 LVEF 55%  Inferior wall segment appears hypokinetic    Cardiac Catheterization:   4/18/2020  PTCA/PCI/BRIAN proximal RCA, single-vessel high-grade proximal RCA.    Imaging  Chest X-Ray:      Stress Test: Not applicable    Assessment/Plan    Inferior STEMI  -Underwent coronary angiography 1/21/2021 revealed recurrent thrombosis of his prior stent consistent with medical noncompliance.  -Status post successful PCI of the RCA using 2 overlapping drug-eluting stents.  -DAPT, aspirin 81, Effient 10 mg, along with atorvastatin 80 mg, lisinopril 10 mg, metoprolol 12.5 mg twice daily      U tox positive for amphetamine, benzodiazepines, cannabinoid  -Ongoing counseling.  -Reiterated cardiotoxicity of methamphetamine.    Hypertension  -Lisinopril, metoprolol  -Uptitrate above for target blood pressure less than 130/80  -Hypertensive this morning, requiring IV labetalol    HAYDEE  -Improving  -Creatinine 1.49    Plan for discharge in a.m.  Repeat BMP  Monitor blood pressure    Thank you for allowing me to participate in the care of this patient.      Please contact me with any questions.    ULYSSES Phillips.   Cardiologist, [facility]  [phone]

## 2021-01-22 NOTE — PROGRESS NOTES
Blood pressure 175/136 and HR 84. IV Labetolol 10 mg  given     Subsequent /106 with HR 94. Will  Continue to monitor

## 2021-01-22 NOTE — PROGRESS NOTES
Q 1 hour VS show still high /107 and HR 91. Second dose of labetalol 10 mg given IV for high diastolic. Subsequent bp 149/103 with HR 86

## 2021-01-22 NOTE — CONSULTS
Cardiology Consult Note:    Nicolas King M.D.  Date & Time note created:    1/21/2021   4:48 PM     Referring MD:  Dr. Cotto    Patient ID:   Name:             Immanuel Borges     YOB: 1975  Age:                 45 y.o.  male   MRN:               [id number]                                                             Reason for Consult:      STEMI    History of Present Illness:    45-year-old male with a past medical history of an RCA STEMI a few months ago.  He was in his normal state of health but he stopped his medications from unknown reason.  He does report recent methamphetamine abuse and also use of sildenafil potentially nitro.  He developed acute onset chest pain and called EMS.  Chest pain described as a pressure-like sensation without radiation with reaching a maximum density of 5 out of 10 not relieved by anything and not worsened by anything.  ECG in the field showed ST elevation in the inferior leads with reciprocal changes.  He was brought in to the ER for further care.  Here in the ER he is continued to have ST elevation and chest pain.  He will be transported emergently to the cardiac Cath Lab for further care.    Review of Systems:      Constitutional: Denies fevers, Denies weight changes  Eyes: Denies changes in vision, no eye pain  Ears/Nose/Throat/Mouth: Denies nasal congestion or sore throat   Cardiovascular: + chest pain, no palpitations   Respiratory: + shortness of breath , Denies cough  Gastrointestinal/Hepatic: Denies abdominal pain, nausea, vomiting, diarrhea, constipation or GI bleeding   Genitourinary: Denies dysuria or frequency  Musculoskeletal/Rheum: Denies  joint pain and swelling, no edema  Skin: Denies rash  Neurological: Denies headache, confusion, memory loss or focal weakness/parasthesias  Psychiatric: denies mood disorder   Endocrine: Marlene thyroid problems  Heme/Oncology/Lymph Nodes: Denies enlarged lymph nodes, denies brusing or known bleeding  disorder  All other systems were reviewed and are negative (AMA/CMS criteria)                Past Medical History:   Past Medical History:   Diagnosis Date   • STEMI (ST elevation myocardial infarction) (HCC)      There are no active hospital problems to display for this patient.      Past Surgical History:  No past surgical history on file.    Hospital Medications:  Current Facility-Administered Medications   Medication Dose   • FENTANYL CITRATE (PF) 0.05 MG/ML INJ SOLN (WRAPPED)     • MIDAZOLAM HCL 2 MG/2ML INJ SOLN (WRAPPED)     • VERAPAMIL HCL 2.5 MG/ML IV SOLN     • HEPARIN SODIUM (PORCINE) 1000 UNIT/ML INJ SOLN     • LIDOCAINE HCL 2 % INJ SOLN     • HEPARIN (PORCINE) IN NACL 2000-0.9 UNIT/L-% IV SOLN     • NITROGLYCERIN 2 MG IV SOLN       Current Outpatient Medications   Medication   • atorvastatin (LIPITOR) 80 MG tablet   • lisinopril (PRINIVIL) 10 MG Tab   • metoprolol (LOPRESSOR) 25 MG Tab   • prasugrel (EFFIENT) 10 MG Tab   • famotidine (PEPCID) 20 MG Tab   • aspirin 81 MG EC tablet   • nitroglycerin (NITROSTAT) 0.4 MG SL Tab         Current Outpatient Medications:  (Not in a hospital admission)      Medication Allergy:  No Known Allergies    Family History:  No family history on file.    Social History:  Social History     Socioeconomic History   • Marital status: Single     Spouse name: Not on file   • Number of children: Not on file   • Years of education: Not on file   • Highest education level: Not on file   Occupational History   • Not on file   Social Needs   • Financial resource strain: Not on file   • Food insecurity     Worry: Not on file     Inability: Not on file   • Transportation needs     Medical: Not on file     Non-medical: Not on file   Tobacco Use   • Smoking status: Light Tobacco Smoker     Types: Cigarettes   • Smokeless tobacco: Never Used   Substance and Sexual Activity   • Alcohol use: Not Currently   • Drug use: Yes     Comment: marijuana   • Sexual activity: Not on file  "  Lifestyle   • Physical activity     Days per week: Not on file     Minutes per session: Not on file   • Stress: Not on file   Relationships   • Social connections     Talks on phone: Not on file     Gets together: Not on file     Attends Taoist service: Not on file     Active member of club or organization: Not on file     Attends meetings of clubs or organizations: Not on file     Relationship status: Not on file   • Intimate partner violence     Fear of current or ex partner: Not on file     Emotionally abused: Not on file     Physically abused: Not on file     Forced sexual activity: Not on file   Other Topics Concern   • Not on file   Social History Narrative   • Not on file         Physical Exam:  Vitals/ General Appearance:   Weight/BMI: Body mass index is 24.43 kg/m².  Ht 1.702 m (5' 7\")   Wt 70.8 kg (156 lb)   Vitals:    01/21/21 1600   Weight: 70.8 kg (156 lb)   Height: 1.702 m (5' 7\")     Oxygen Therapy:       Constitutional:   Well developed, Well nourished, No acute distress  HENMT:  Normocephalic, Atraumatic, Oropharynx moist mucous membranes, No oral exudates, Nose normal.  No thyromegaly.  Eyes:  EOMI, Conjunctiva normal, No discharge.  Neck:  Normal range of motion, No cervical tenderness,  no JVD.  Cardiovascular:  Normal heart rate, Normal rhythm, No murmurs, No rubs, No gallops.   Extremitites with intact distal pulses, no cyanosis, or edema.  Lungs:  Normal breath sounds, breath sounds clear to auscultation bilaterally,  no crackles, no wheezing.   Abdomen: Bowel sounds normal, Soft, No tenderness, No guarding, No rebound, No masses, No hepatosplenomegaly.  Skin: Warm, Dry, No erythema, No rash, no induration.  Neurologic: Alert & oriented x 3, No focal deficits noted, cranial nerves II through X are grossly intact.  Psychiatric: Affect normal, Judgment normal, Mood normal.      MDM (Data Review):     Records reviewed and summarized in current documentation    Lab Data Review:  No results " found for this or any previous visit (from the past 24 hour(s)).    Imaging/Procedures Review:    Chest Xray:  Reviewed    EKG:   As in HPI.     ECHO: 2020 personally viewed interpreted myself showing:  No prior study is available for comparison.   Normal left ventricular chamber size.  Mild concentric left ventricular hypertrophy.  Left ventricular systolic function is low normal.  Left ventricular ejection fraction is visually estimated to be 55%.  Inferior wall segment appears hypokinetic.    MDM (Assessment and Plan):     There are no active hospital problems to display for this patient.    45-year-old male with an inferior STEMI with be transported to cardiac Cath Lab for further evaluation.  Please see addendum for further care    Addendum: Recurrent thrombosis of his prior stent consistent with medical noncompliance.  He will be reperfused and transported back to the telemetry floor.  Further care per STEMI protocol.

## 2021-01-22 NOTE — ASSESSMENT & PLAN NOTE
At home on lisinopril, metoprolol   Patient reports that he is not taking medication since past few months.    Plan  Crease metoprolol to 25 mg twice daily  Hold lisinopril in the setting of acute kidney injury  And nifedipine 30 mg daily  IV labetalol as needed  Will uptitrate medications as indicated

## 2021-01-22 NOTE — THERAPY
"Physical Therapy   Initial Evaluation     Patient Name: Immanuel Borges  Age:  45 y.o., Sex:  male  Medical Record #: [id number]  Today's Date: 1/22/2021          Assessment  Patient is 45 y.o. male with a diagnosis of STEMI with catheterization with 2 stents on 1/21/21. Other PMH includes STEMI in 2020, HTN, HLD, meth abuse. Pt reports he lives with his fiance in a 4th floor apartment with 4 flights of stairs or elevator access. He reports he was independent with self care and mobility at home. Today, pt demonstrated good ability with bed mobility, transfers, balance, and gait. He was responsive to cardiac education regarding RPE and walking program. Pt has no further need for skilled PT in the acute setting at this time. Pt does not likely require further PT prior to D/C home.    Plan    Recommend Physical Therapy for Evaluation only     DC Equipment Recommendations: None  Discharge Recommendations: Anticipate that the patient will have no further physical therapy needs after discharge from the hospital       Subjective    Feeling \"fine\"     Objective       01/22/21 0922   Prior Living Situation   Prior Services Home-Independent   Housing / Facility 1 Story Apartment / Condo   Steps Into Home   (4 flights)   Steps In Home 0   Elevator Yes   Equipment Owned None   Lives with - Patient's Self Care Capacity Significant Other   Comments reports independent with self care and mobility at baseline sans AD   Prior Level of Functional Mobility   Bed Mobility Independent   Transfer Status Independent   Ambulation Independent   Distance Ambulation (Feet)   (community)   Assistive Devices Used None   Stairs Independent   Cognition    Cognition / Consciousness WDL   Strength Lower Body   Lower Body Strength  WDL   Balance Assessment   Sitting Balance (Static) Good   Sitting Balance (Dynamic) Good   Standing Balance (Static) Good   Standing Balance (Dynamic) Good   Weight Shift Sitting Good   Weight Shift Standing Good   Comments no " AD   Gait Analysis   Gait Level Of Assist Supervised   Assistive Device None   Distance (Feet) 600   # of Times Distance was Traveled 1   Deviation No deviation   # of Stairs Climbed 0   Weight Bearing Status no restriction   Bed Mobility    Supine to Sit Supervised   Scooting Supervised   Rolling Supervised   Functional Mobility   Sit to Stand Supervised   Transfer Method Stand Step   Mobility supine->sit EOB->stand->amb->sit EOB   How much help from another person does the patient currently need...   6 clicks Mobility Score 24   Activity Tolerance   Sitting in Chair declined   Sitting Edge of Bed 3 minutes   Standing 8 minutes   Education Group   Education Provided Cardiac Precautions;Role of Physical Therapist   Cardiac Precautions Patient Response Patient;Acceptance;Explanation;Handout;Verbal Demonstration   Role of Physical Therapist Patient Response Patient;Acceptance;Explanation;Verbal Demonstration   Anticipated Discharge Equipment and Recommendations   DC Equipment Recommendations None   Discharge Recommendations Anticipate that the patient will have no further physical therapy needs after discharge from the hospital   Interdisciplinary Plan of Care Collaboration   IDT Collaboration with  Nursing   Patient Position at End of Therapy Seated;Edge of Bed;Call Light within Reach;Tray Table within Reach   Collaboration Comments report on mobility, D/C rec

## 2021-01-22 NOTE — PROCEDURES
"CARDIAC CATHETERIZATION REPORT    Referring Provider: Nicolas King M.D.    PROCEDURE PHYSICIAN: Edwardo Johnson MD, [facility], [facility]  ASSISTANT: None    IMPRESSIONS:  1.  Acute inferior STEMI due to late stent thrombosis  2.  Successful PCI of the RCA using 2 overlapping drug-eluting stents  3.  The patient is fully revascularized  4.  Normal LVEDP  5.  Mild to moderate impairment in LV systolic function    Recommendations:  Usual post PCI care  Further recommendations per the rounding team    Pre-procedure diagnosis: Inferior STEMI  Post-procedure diagnosis: Same    Procedure performed  Selective coronary angiography  Left heart catheterization  Supravalvular aortography  Percutaneous coronary intervention - Stent Placement (BRIAN x2 to the proximal RCA)    Conscious sedation was supervised by myself and administered by trained personnel using fentanyl and versed between 1648 and 1710. The patient tolerated sedation without complication.     Procedure Description  1. Access: 6 Slovenian right radial artery Micropuncture technique was utilized following local anesthesia with lidocaine.  A radial cocktail containing verapamil, heparin and saline was administered in the radial artery sheath    2. Diagnostic description: The catheter was passed to the central circulation with the aide of J tipped 0.35\" wire. A 5F TIG 4.0 was used to inject the coronary circulation inject the left ventricle during invasive hemodynamic monitoring.     3. Description of Intervention: After confirming therapeutic anticoagulation intervention proceeded. A 6 Slovenian JABA guiding catheter was used. The lesion in the RCA was crossed with a 0.014\" [place] wire.  The lesion was predilated with a 2.0 x 15 millimeters compliant balloon.  I then deployed a 3.0 x 26 mm Towson drug-eluting stent at 18 irish.  There was residual lesion at the distal edge which was covered with a 3.5 x 12 mm Towson drug-eluting stent deployed at 12 irish.  The stent length was " subsequently postdilated with the 3.5 mm stent balloon at 18 irish which corresponds to a size of 3.75 mm.  This procedure angiograms demonstrated excellent result    4. Closing: At completion of the procedure the relevant equipment was removed from the body and hemostasis achieved by Radial band    Findings   Hemodynamics:   Aorta: 105/68 mmHg  LV: 105/9 mmHg    Coronary Anatomy   Left Main: Normal   LAD: 30% mid vessel stenosis   LCx: Normal   RCA: Dominant, 100% in-stent occlusion proximally    Left Ventriculography:   LVEF= 40%. Hypokinesis in the Inferior wall    Results of intervention to the RCA:  Pre: 100% stenosis and BOBBY 0 flow  Post: 0% residual stenosis and BOBBY III flow. No dissection or distal embolization.    Technical Factors  1. Complications: None  2. Estimated Blood Loss: <50 cc  3. Specimens: None  4. Contrast Volume: 75 ml  5. Medications: Radial cocktail (Verapamil 2.5 mg, Nitroglycerin 100 mcg) Heparin 8000 units Prasugrel 60 mg  6. Radiation (Air Kerma): 232 mGy

## 2021-01-22 NOTE — PROGRESS NOTES
Received report from ADAM Slade. Reviewed POC, no questions at this time. Call light is within reach, bed is in lowest/locked position.

## 2021-01-22 NOTE — ASSESSMENT & PLAN NOTE
History of RCA stent placement April thousand 20  Noted for STEMI on the field and again at the ED  Patient underwent emergent catheterization  He received 2 drug-eluting stents at the proximal RCA  Admit to telemetry for post STEMI monitoring  Received verapamil, prasugrel and heparin bolus after cath  Currently on dual antiplatelet therapy, high-dose statin, metoprolol 12.5 mg twice daily.  Lisinopril held due to HAYDEE.    Plan  Continue to monitor on telemetry.  Monitor and replete electrolytes daily.  Continue cardioprotective medications  Start lisinopril once kidney function normalizes.  Pending Hb 1 AC, lipid panel.  Smoking, methamphetamine cessation counseling provided.

## 2021-01-22 NOTE — PROGRESS NOTES
Daily Progress Note:     Date of Service: 1/22/2021  Primary Team: [facility] IM Red Team    Attending: ERYN Anderson*   Senior Resident: Dr. Doan   IContact:  [phone]    Chief Complaint:   Chest pain    ID:  45-year-old male with past medical history of STEMI in April 2020, chronic tobacco and methamphetamine use who presented to the emergency department on 1/21/2021 due to sudden onset chest pain.  On presentation, patient hemodynamically stable, was found to have STEMI.  Underwent coronary angiography which revealed recurrent thrombosis of his prior stent consistent with medical noncompliance.  Patient had successful to drug eluting stents placed in right coronary artery.  Patient is admitted to telemetry floor for post STEMI monitoring.    Interval update  There were no overnight events    Patient was resting comfortably in his bed.  Denied chest pain, shortness of breath, nausea, vomiting, abdominal pain, diarrhea, constipation, numbness, weakness, fever, chills.    Hemodynamically stable, blood pressure elevated in the 160-180 range, received IV labetalol this morning.  On room air.    We will continue dual antiplatelet therapy along with atorvastatin, metoprolol.  Will hold lisinopril in the setting of acute kidney injury.  Will add nifedipine 30 mg daily and will increase metoprolol dose to 25 mg twice daily.Will closely monitor blood pressure and uptitrate the medications as indicated.      Consultants/Specialty:  Cardiology      Review of Systems:   Review of Systems   Constitutional: Negative for chills, fever and malaise/fatigue.   HENT: Negative for congestion, ear discharge and nosebleeds.    Eyes: Negative for blurred vision and double vision.   Respiratory: Negative for cough, hemoptysis, sputum production and shortness of breath.    Cardiovascular: Negative for chest pain, palpitations and leg swelling.   Gastrointestinal: Negative for abdominal pain, diarrhea, nausea and vomiting.    Genitourinary: Negative for dysuria and urgency.   Musculoskeletal: Negative for myalgias.   Neurological: Negative for dizziness, tremors and sensory change.   Psychiatric/Behavioral: Negative for depression.       Objective Data:   Physical Exam:   Vitals:   Temp:  [36.3 °C (97.3 °F)-37 °C (98.6 °F)] 36.7 °C (98.1 °F)  Pulse:  [] 73  Resp:  [16-18] 18  BP: (144-182)/() 146/98  SpO2:  [96 %-100 %] 99 %   Physical Exam  Constitutional:       General: He is not in acute distress.     Appearance: Normal appearance.   HENT:      Head: Normocephalic and atraumatic.      Nose: Nose normal.      Mouth/Throat:      Mouth: Mucous membranes are moist.   Eyes:      Extraocular Movements: Extraocular movements intact.      Pupils: Pupils are equal, round, and reactive to light.   Cardiovascular:      Rate and Rhythm: Normal rate and regular rhythm.      Pulses: Normal pulses.      Heart sounds: Normal heart sounds. No murmur.   Pulmonary:      Effort: Pulmonary effort is normal. No respiratory distress.      Breath sounds: Normal breath sounds.   Abdominal:      General: Abdomen is flat. There is no distension.      Tenderness: There is no abdominal tenderness.   Musculoskeletal:      Right lower leg: No edema.      Left lower leg: No edema.   Skin:     General: Skin is warm.   Neurological:      General: No focal deficit present.      Mental Status: He is alert and oriented to person, place, and time. Mental status is at baseline.      Cranial Nerves: No cranial nerve deficit.      Sensory: No sensory deficit.           Labs:   Lab Results   Component Value Date/Time    WBC 7.2 01/22/2021 02:19 AM    RBC 4.96 01/22/2021 02:19 AM    HEMOGLOBIN 13.8 (L) 01/22/2021 02:19 AM    HEMATOCRIT 41.2 (L) 01/22/2021 02:19 AM    MCV 83.1 01/22/2021 02:19 AM    MCH 27.8 01/22/2021 02:19 AM    MCHC 33.5 (L) 01/22/2021 02:19 AM    MPV 11.6 01/22/2021 02:19 AM    NEUTSPOLYS 53.00 01/22/2021 02:19 AM    LYMPHOCYTES 40.40  01/22/2021 02:19 AM    MONOCYTES 5.70 01/22/2021 02:19 AM    EOSINOPHILS 0.40 01/22/2021 02:19 AM    BASOPHILS 0.40 01/22/2021 02:19 AM        Lab Results   Component Value Date/Time    SODIUM 131 (L) 01/22/2021 02:19 AM    POTASSIUM 3.8 01/22/2021 02:19 AM    CHLORIDE 96 01/22/2021 02:19 AM    CO2 22 01/22/2021 02:19 AM    GLUCOSE 94 01/22/2021 02:19 AM    BUN 17 01/22/2021 02:19 AM    CREATININE 1.49 (H) 01/22/2021 02:19 AM      Imaging:   CL-LEFT HEART CATHETERIZATION WITH POSSIBLE INTERVENTION    (Results Pending)       Problem Representation:   * STEMI (ST elevation myocardial infarction) ([facility])  Assessment & Plan  History of RCA stent placement April thousand 20  Noted for STEMI on the field and again at the ED  Patient underwent emergent catheterization  He received 2 drug-eluting stents at the proximal RCA  Admit to telemetry for post STEMI monitoring  Received verapamil, prasugrel and heparin bolus after cath  Currently on dual antiplatelet therapy, high-dose statin, metoprolol 12.5 mg twice daily.  Lisinopril held due to HAYDEE.    Plan  Continue to monitor on telemetry.  Monitor and replete electrolytes daily.  Continue cardioprotective medications  Start lisinopril once kidney function normalizes.  Pending Hb 1 AC, lipid panel.  Smoking, methamphetamine cessation counseling provided.      Essential hypertension- (present on admission)  Assessment & Plan  At home on lisinopril, metoprolol   Patient reports that he is not taking medication since past few months.    Plan  Crease metoprolol to 25 mg twice daily  Hold lisinopril in the setting of acute kidney injury  And nifedipine 30 mg daily  IV labetalol as needed  Will uptitrate medications as indicated    Other hyperlipidemia- (present on admission)  Assessment & Plan  Last lipid panel at goal in April 2020.  Repeat lipid panel.  Continue high-dose statin    HAYDEE (acute kidney injury) ([facility])  Assessment & Plan  On admission BUN 17, creatinine 1.72.  Previous  BUN 9, creatinine 1.18.  Likely prerenal can due to decreased perfusion in the setting of myocardial infarction.  Kidney function improved to BUN 17, creatinine 1.49   Continue to monitor with daily BMP.    Hypokalemia  Assessment & Plan  Replete as necessary   Goal above 4.

## 2021-01-23 ENCOUNTER — PHARMACY VISIT (OUTPATIENT)
Dept: PHARMACY | Facility: MEDICAL CENTER | Age: 46
End: 2021-01-23
Payer: COMMERCIAL

## 2021-01-23 VITALS
WEIGHT: 160.27 LBS | TEMPERATURE: 98.4 F | HEIGHT: 67 IN | OXYGEN SATURATION: 99 % | BODY MASS INDEX: 25.16 KG/M2 | RESPIRATION RATE: 18 BRPM | DIASTOLIC BLOOD PRESSURE: 94 MMHG | HEART RATE: 64 BPM | SYSTOLIC BLOOD PRESSURE: 139 MMHG

## 2021-01-23 LAB
ANION GAP SERPL CALC-SCNC: 9 MMOL/L (ref 7–16)
BUN SERPL-MCNC: 13 MG/DL (ref 8–22)
CALCIUM SERPL-MCNC: 9 MG/DL (ref 8.5–10.5)
CHLORIDE SERPL-SCNC: 102 MMOL/L (ref 96–112)
CO2 SERPL-SCNC: 19 MMOL/L (ref 20–33)
CREAT SERPL-MCNC: 1.15 MG/DL (ref 0.5–1.4)
GLUCOSE SERPL-MCNC: 112 MG/DL (ref 65–99)
MAGNESIUM SERPL-MCNC: 1.9 MG/DL (ref 1.5–2.5)
POTASSIUM SERPL-SCNC: 4 MMOL/L (ref 3.6–5.5)
SODIUM SERPL-SCNC: 130 MMOL/L (ref 135–145)

## 2021-01-23 PROCEDURE — 700102 HCHG RX REV CODE 250 W/ 637 OVERRIDE(OP): Performed by: STUDENT IN AN ORGANIZED HEALTH CARE EDUCATION/TRAINING PROGRAM

## 2021-01-23 PROCEDURE — A9270 NON-COVERED ITEM OR SERVICE: HCPCS | Performed by: NURSE PRACTITIONER

## 2021-01-23 PROCEDURE — 99238 HOSP IP/OBS DSCHRG MGMT 30/<: CPT | Mod: GC | Performed by: INTERNAL MEDICINE

## 2021-01-23 PROCEDURE — A9270 NON-COVERED ITEM OR SERVICE: HCPCS | Performed by: STUDENT IN AN ORGANIZED HEALTH CARE EDUCATION/TRAINING PROGRAM

## 2021-01-23 PROCEDURE — 700102 HCHG RX REV CODE 250 W/ 637 OVERRIDE(OP): Performed by: INTERNAL MEDICINE

## 2021-01-23 PROCEDURE — 700102 HCHG RX REV CODE 250 W/ 637 OVERRIDE(OP): Performed by: NURSE PRACTITIONER

## 2021-01-23 PROCEDURE — 83735 ASSAY OF MAGNESIUM: CPT

## 2021-01-23 PROCEDURE — 99232 SBSQ HOSP IP/OBS MODERATE 35: CPT | Performed by: INTERNAL MEDICINE

## 2021-01-23 PROCEDURE — A9270 NON-COVERED ITEM OR SERVICE: HCPCS | Performed by: INTERNAL MEDICINE

## 2021-01-23 PROCEDURE — 80048 BASIC METABOLIC PNL TOTAL CA: CPT

## 2021-01-23 PROCEDURE — 36415 COLL VENOUS BLD VENIPUNCTURE: CPT

## 2021-01-23 PROCEDURE — RXMED WILLOW AMBULATORY MEDICATION CHARGE: Performed by: STUDENT IN AN ORGANIZED HEALTH CARE EDUCATION/TRAINING PROGRAM

## 2021-01-23 RX ORDER — METOPROLOL TARTRATE 50 MG/1
50 TABLET, FILM COATED ORAL 2 TIMES DAILY
Qty: 90 TAB | Refills: 3 | Status: ON HOLD | OUTPATIENT
Start: 2021-01-23 | End: 2021-02-19

## 2021-01-23 RX ORDER — FAMOTIDINE 20 MG/1
20 TABLET, FILM COATED ORAL 2 TIMES DAILY
Qty: 90 TAB | Refills: 1 | Status: SHIPPED | OUTPATIENT
Start: 2021-01-23

## 2021-01-23 RX ORDER — NIFEDIPINE 60 MG/1
60 TABLET, EXTENDED RELEASE ORAL
Status: DISCONTINUED | OUTPATIENT
Start: 2021-01-24 | End: 2021-01-23

## 2021-01-23 RX ORDER — LISINOPRIL 20 MG/1
20 TABLET ORAL DAILY
Qty: 90 TAB | Refills: 1 | Status: ON HOLD | OUTPATIENT
Start: 2021-01-24 | End: 2021-02-19 | Stop reason: SDUPTHER

## 2021-01-23 RX ORDER — NITROGLYCERIN 0.4 MG/1
0.4 TABLET SUBLINGUAL PRN
Qty: 25 TAB | Refills: 1 | Status: SHIPPED | OUTPATIENT
Start: 2021-01-23

## 2021-01-23 RX ORDER — NIFEDIPINE 30 MG/1
30 TABLET, EXTENDED RELEASE ORAL ONCE
Status: DISCONTINUED | OUTPATIENT
Start: 2021-01-23 | End: 2021-01-23

## 2021-01-23 RX ORDER — ATORVASTATIN CALCIUM 80 MG/1
80 TABLET, FILM COATED ORAL EVERY EVENING
Qty: 90 TAB | Refills: 1 | Status: SHIPPED | OUTPATIENT
Start: 2021-01-23 | End: 2021-02-23 | Stop reason: SDUPTHER

## 2021-01-23 RX ORDER — PRASUGREL 10 MG/1
10 TABLET, FILM COATED ORAL DAILY
Qty: 90 TAB | Refills: 3 | Status: SHIPPED | OUTPATIENT
Start: 2021-01-23 | End: 2021-02-23 | Stop reason: SDUPTHER

## 2021-01-23 RX ORDER — ASPIRIN 81 MG/1
81 TABLET ORAL DAILY
Qty: 90 TAB | Refills: 3 | Status: SHIPPED | OUTPATIENT
Start: 2021-01-23

## 2021-01-23 RX ORDER — METOPROLOL TARTRATE 50 MG/1
50 TABLET, FILM COATED ORAL 2 TIMES DAILY
Status: DISCONTINUED | OUTPATIENT
Start: 2021-01-23 | End: 2021-01-23 | Stop reason: HOSPADM

## 2021-01-23 RX ORDER — LISINOPRIL 20 MG/1
20 TABLET ORAL DAILY
Status: DISCONTINUED | OUTPATIENT
Start: 2021-01-24 | End: 2021-01-23 | Stop reason: HOSPADM

## 2021-01-23 RX ADMIN — METOPROLOL TARTRATE 25 MG: 25 TABLET, FILM COATED ORAL at 05:43

## 2021-01-23 RX ADMIN — NIFEDIPINE 30 MG: 30 TABLET, EXTENDED RELEASE ORAL at 09:15

## 2021-01-23 RX ADMIN — LISINOPRIL 10 MG: 10 TABLET ORAL at 07:14

## 2021-01-23 RX ADMIN — ASPIRIN 81 MG: 81 TABLET, COATED ORAL at 05:43

## 2021-01-23 RX ADMIN — NIFEDIPINE 30 MG: 30 TABLET, EXTENDED RELEASE ORAL at 05:43

## 2021-01-23 RX ADMIN — PRASUGREL 10 MG: 10 TABLET, FILM COATED ORAL at 05:43

## 2021-01-23 ASSESSMENT — ENCOUNTER SYMPTOMS
COUGH: 0
CHEST TIGHTNESS: 0
WHEEZING: 0
APNEA: 0
CHOKING: 0
SHORTNESS OF BREATH: 0
STRIDOR: 0

## 2021-01-23 NOTE — DISCHARGE INSTRUCTIONS
Discharge Instructions    Discharged to home by car with relative. Discharged via wheelchair, hospital escort: Yes.  Special equipment needed: Not Applicable    Be sure to schedule a follow-up appointment with your primary care doctor or any specialists as instructed.     Discharge Plan:   Diet Plan: Discussed  Activity Level: Discussed  Confirmed Follow up Appointment: Appointment Scheduled  Confirmed Symptoms Management: Discussed  Medication Reconciliation Updated: Yes  Influenza Vaccine Indication: Patient Refuses    I understand that a diet low in cholesterol, fat, and sodium is recommended for good health. Unless I have been given specific instructions below for another diet, I accept this instruction as my diet prescription.   Other diet: cardiac     Special Instructions: Diagnosis:  Acute Coronary Syndrome (ACS) is a diagnosis that encompasses cardiac-related chest pain and heart attack. ACS occurs when the blood flow to the heart muscle is severely reduced or cut off completely due to a slow process called atherosclerosis.  Atherosclerosis is a disease in which the coronary arteries become narrow from a buildup of fat, cholesterol, and other substances that combine to form plaque. If the plaque breaks, a blood clot will form and block the blood flow to the heart muscle. This lack of blood flow can cause damage or death to the heart muscle which is called a heart attack or Myocardial Infarction (MI). There are two different types of MIs:  ST Elevation Myocardial Infarction or STEMI (the most severe type of heart attack) and Non-ST Elevation Myocardial Infarction or NSTEMI.    Treatment Plan:  · Cardiac Diet  - Low fat, low salt, low cholesterol   · Cardiac Rehab  - Your doctor has ordered you a referral to [facility].  Call [phone] to schedule an appointment.  · Attend my follow-up appointment with my Cardiologist.  · Take my medications as prescribed by my doctor  · Exercise daily  · Quit Smoking, Lower  my bad cholesterol and raise my good cholesterol, lower my blood pressure, Reduce stress and Control my diabetes    Medications:  Certain medications are used to treat ACS.  Remember to always take medications as prescribed and never stop talking medications unless told by your doctor.    You have been prescribed the following medicatons:    Aspirin - Aspirin is used as a blood thinning medication and you will require this medication indefinitely.  Anti-platelet/blood thinner - Your Anti-platelet/Blood thinning medication is called Effient, and is used in combination with aspirin to prevent clots from forming in your heart and/or around your stent.  Your doctor will determine how long you need to be on this medicine.  Beta-Blocker - Beta-Blocker Metprolol is used to lower blood pressure and heart rate, and/or helps your heart heal after a heart attack.  Statin - Statin Lipitor is used to lower cholesterol.  Angiotensin Converting Enzyme (ACE) Inhibitor - Angiotensin Converting Enzyme Inhibitor Lisinopril  is used to lower blood pressure and treat heart failure.  Nitroglycerine - Nitroglycerine is used to relieve chest pain.    · Is patient discharged on Warfarin / Coumadin?   No       Heart Attack  The heart is a muscle that needs oxygen to survive. A heart attack is a condition that occurs when your heart does not get enough oxygen. When this happens, the heart muscle begins to die. This can cause permanent damage if not treated right away. A heart attack is a medical emergency.  This condition may be called a myocardial infarction, or MI. It is also known as acute coronary syndrome (ACS). ACS is a term used to describe a group of conditions that affect blood flow to the heart.  What are the causes?  This condition may be caused by:  · Atherosclerosis. This occurs when a fatty substance called plaque builds up in the arteries and blocks or reduces blood supply to the heart.  · A blood clot. A blood clot can develop  suddenly when plaque breaks up within an artery and blocks blood flow to the heart.  · Low blood pressure.  · An abnormal heartbeat (arrhythmia).  · Conditions that cause a decrease of oxygen to the heart, such as anemiaorrespiratory failure.  · A spasm, or severe tightening, of a blood vessel that cuts off blood flow to the heart.  · Tearing of a coronary artery (spontaneous coronary artery dissection).  · High blood pressure.  What increases the risk?  The following factors may make you more likely to develop this condition:  · Aging. The older you are, the higher your risk.  · Having a personal or family history of chest pain, heart attack, stroke, or narrowing of the arteries in the legs, arms, head, or stomach (peripheral artery disease).  · Being male.  · Smoking.  · Not getting regular exercise.  · Being overweight or obese.  · Having high blood pressure.  · Having high cholesterol (hypercholesterolemia).  · Having diabetes.  · Drinking too much alcohol.  · Using illegal drugs, such as cocaine or methamphetamine.  What are the signs or symptoms?  Symptoms of this condition may vary, depending on factors like gender and age. Symptoms may include:  · Chest pain. It may feel like:  ? Crushing or squeezing.  ? Tightness, pressure, fullness, or heaviness.  · Pain in the arm, neck, jaw, back, or upper body.  · Shortness of breath.  · Heartburn or upset stomach.  · Nausea.  · Sudden cold sweats.  · Feeling tired.  · Sudden light-headedness.  How is this diagnosed?  This condition may be diagnosed through tests, such as:  · Electrocardiogram (ECG) to measure the electrical activity of your heart.  · Blood tests to check for cardiac markers. These chemicals are released by a damaged heart muscle.  · A test to evaluate blood flow and heart function (coronary angiogram).  · CT scan to see the heart more clearly.  · A test to evaluate the pumping action of the heart (echocardiogram).  How is this treated?  A heart attack  must be treated as soon as possible. Treatment may include:  · Medicines to:  ? Break up or dissolve blood clots (fibrinolytic therapy).  ? Thin blood and help prevent blood clots.  ? Treat blood pressure.  ? Improve blood flow to the heart.  ? Reduce pain.  ? Reduce cholesterol.  · Angioplasty and stent placement. These are procedures to widen a blocked artery and keep it open.  · Coronary artery bypass graft, CABG, or open heart surgery. This enables blood to flow to the heart by going around the blocked part of the artery.  · Oxygen therapy if needed.  · Cardiac rehabilitation. This improves your health and well-being through exercise, education, and counseling.  Follow these instructions at home:  Medicines  · Take over-the-counter and prescription medicines only as told by your health care provider.  · Do not take the following medicines unless your health care provider says it is okay to take them:  ? NSAIDs, such as ibuprofen.  ? Supplements that contain vitamin A, vitamin E, or both.  ? Hormone replacement therapy that contains estrogen with or without progestin.  Lifestyle    · Do not use any products that contain nicotine or tobacco, such as cigarettes, e-cigarettes, and chewing tobacco. If you need help quitting, ask your health care provider.  · Avoid secondhand smoke.  · Exercise regularly. Ask your health care provider about participating in a cardiac rehabilitation program that helps you start exercising safely after a heart attack.  · Eat a heart-healthy diet. Your health care provider will tell you what foods to eat.  · Maintain a healthy weight.  · Learn ways to manage stress.  · Do not use illegal drugs.  Alcohol use  · Do not drink alcohol if:  ? Your health care provider tells you not to drink.  ? You are pregnant, may be pregnant, or are planning to become pregnant.  · If you drink alcohol:  ? Limit how much you use to:  § 0-1 drink a day for women.  § 0-2 drinks a day for men.  ? Be aware of  how much alcohol is in your drink. In the [place] one drink equals one 12 oz bottle of beer (355 mL), one 5 oz glass of wine (148 mL), or one 1½ oz glass of hard liquor (44 mL).  General instructions  · Work with your health care provider to manage any other conditions you have, such as high blood pressure or diabetes. These conditions affect your heart.  · Get screened for depression, and seek treatment if needed.  · Keep your vaccinations up to date. Get the flu vaccine every year.  · Keep all follow-up visits as told by your health care provider. This is important.  Contact a health care provider if:  · You feel overwhelmed or sad.  · You have trouble doing your daily activities.  Get help right away if:  · You have sudden, unexplained discomfort in your chest, arms, back, neck, jaw, or upper body.  · You have shortness of breath.  · You suddenly start to sweat or your skin gets clammy.  · You feel nauseous or you vomit.  · You have unexplained tiredness or weakness.  · You suddenly feel light-headed or dizzy.  · You notice your heart starts to beat fast or feels like it is skipping beats.  · You have blood pressure that is higher than 180/120.  These symptoms may represent a serious problem that is an emergency. Do not wait to see if the symptoms will go away. Get medical help right away. Call your local emergency services (911 in the [place] Do not drive yourself to the hospital.  Summary  · A heart attack, also called myocardial infarction, is a condition that occurs when your heart does not get enough oxygen. This is caused by anything that blocks or reduces blood flow to the heart.  · Treatment is a combination of medicines and surgeries, if needed, to open the blocked arteries and restore blood flow to the heart.  · A heart attack is an emergency. Get help right away if you have sudden discomfort in your chest, arms, back, neck, jaw, or upper body. Seek help if you feel nauseous, you vomit, or you feel  light-headed or dizzy.  This information is not intended to replace advice given to you by your health care provider. Make sure you discuss any questions you have with your health care provider.  Document Released: 12/18/2006 Document Revised: 03/26/2020 Document Reviewed: 03/30/2020  Elsevier Patient Education © 2020 [employer]      Depression / Suicide Risk    As you are discharged from this [facility] facility, it is important to learn how to keep safe from harming yourself.    Recognize the warning signs:  · Abrupt changes in personality, positive or negative- including increase in energy   · Giving away possessions  · Change in eating patterns- significant weight changes-  positive or negative  · Change in sleeping patterns- unable to sleep or sleeping all the time   · Unwillingness or inability to communicate  · Depression  · Unusual sadness, discouragement and loneliness  · Talk of wanting to die  · Neglect of personal appearance   · Rebelliousness- reckless behavior  · Withdrawal from people/activities they love  · Confusion- inability to concentrate     If you or a loved one observes any of these behaviors or has concerns about self-harm, here's what you can do:  · Talk about it- your feelings and reasons for harming yourself  · Remove any means that you might use to hurt yourself (examples: pills, rope, extension cords, firearm)  · Get professional help from the community (Mental Health, Substance Abuse, psychological counseling)  · Do not be alone:Call your Safe Contact- someone whom you trust who will be there for you.  · Call your local CRISIS HOTLINE 721-8321 or 417-536-1177  · Call your local Children's Mobile Crisis Response Team [place] (725) 853-1970 or www.Yoozon  · Call the toll free National Suicide Prevention Hotlines   · National Suicide Prevention Lifeline 266-952-SWWN (6366)  · National Hope Line Network 800-SUICIDE (146-3301)

## 2021-01-23 NOTE — PROGRESS NOTES
Cardiology Follow Up Progress Note    Date of Service  1/23/2021    Attending Physician  ERYN Anderson*    Past medical history significant for RCA STEMI status post successful PCI (4/18/2020), chronic tobacco use, methamphetamine use      Presented with chest pain    Cardiology consultation for RCA STEMI    Interim Events    No overnight cardiac events  Monitor rhythm: Sinus rhythm  No recurrent chest discomfort  Hypertensive , adjusting cardiac meds  Labs reviewed  HAYDEE, resolved  K, 4  U tox positive for benzodiazepines, cannabinoid, amphetamine      Review of Systems  Review of Systems   Respiratory: Negative for apnea, cough, choking, chest tightness, shortness of breath, wheezing and stridor.        Vital signs in last 24 hours  Temp:  [35.8 °C (96.4 °F)-37.3 °C (99.2 °F)] 36.8 °C (98.2 °F)  Pulse:  [71-81] 74  Resp:  [18] 18  BP: (146-161)/() 154/106  SpO2:  [98 %-100 %] 100 %    Physical Exam  Physical Exam  Cardiovascular:      Rate and Rhythm: Normal rate and regular rhythm.      Pulses: Normal pulses.   Pulmonary:      Effort: Pulmonary effort is normal.   Skin:     General: Skin is warm.      Comments: Right radial cath site without evidence of hematoma   Neurological:      General: No focal deficit present.      Mental Status: He is alert.   Psychiatric:         Mood and Affect: Mood normal.         Lab Review  Lab Results   Component Value Date/Time    WBC 7.2 01/22/2021 02:19 AM    RBC 4.96 01/22/2021 02:19 AM    HEMOGLOBIN 13.8 (L) 01/22/2021 02:19 AM    HEMATOCRIT 41.2 (L) 01/22/2021 02:19 AM    MCV 83.1 01/22/2021 02:19 AM    MCH 27.8 01/22/2021 02:19 AM    MCHC 33.5 (L) 01/22/2021 02:19 AM    MPV 11.6 01/22/2021 02:19 AM      Lab Results   Component Value Date/Time    SODIUM 130 (L) 01/23/2021 02:52 AM    POTASSIUM 4.0 01/23/2021 02:52 AM    CHLORIDE 102 01/23/2021 02:52 AM    CO2 19 (L) 01/23/2021 02:52 AM    GLUCOSE 112 (H) 01/23/2021 02:52 AM    BUN 13 01/23/2021 02:52 AM     CREATININE 1.15 01/23/2021 02:52 AM      Lab Results   Component Value Date/Time    ASTSGOT 34 01/22/2021 02:19 AM    ALTSGPT 13 01/22/2021 02:19 AM     Lab Results   Component Value Date/Time    CHOLSTRLTOT 148 01/22/2021 02:19 AM    LDL 83 01/22/2021 02:19 AM    HDL 38 (A) 01/22/2021 02:19 AM    TRIGLYCERIDE 137 01/22/2021 02:19 AM    TROPONINT 35 (H) 01/21/2021 04:45 PM       No results for input(s): NTPROBNP in the last 72 hours.    Cardiac Imaging and Procedures Review  EKG: Inferior MI    Echocardiogram:    4/18/2020 LVEF 55%  Inferior wall segment appears hypokinetic    Cardiac Catheterization:   4/18/2020  PTCA/PCI/BRIAN proximal RCA, single-vessel high-grade proximal RCA.    Imaging  Chest X-Ray:      Stress Test: Not applicable    Assessment/Plan    Inferior STEMI  -Underwent coronary angiography 1/21/2021 revealed recurrent thrombosis of his prior stent consistent with medical noncompliance.  -Status post successful PCI of the RCA using 2 overlapping drug-eluting stents.  -DAPT, aspirin 81, Effient 10 mg, along with atorvastatin 80 mg, lisinopril 10 mg, metoprolol 12.5 mg twice daily      U tox positive for amphetamine, benzodiazepines, cannabinoid  -Ongoing counseling.  -Reiterated cardiotoxicity of methamphetamine.    Hypertension  -Lisinopril, metoprolol  -Uptitrate above for target blood pressure less than 130/80      HAYDEE  -Resolved    Stable for discharge from cardiac stand.  Discussed strict adherence to DAPT, along with atorvastatin, metoprolol, lisinopril.  Close follow-up with our cardiology office, will arrange  Cardiac rehab, will arrange    Thank you for allowing me to participate in the care of this patient.      Please contact me with any questions.    ULYSSES Phillips.   Cardiologist, [facility]  [phone]

## 2021-01-23 NOTE — DISCHARGE SUMMARY
Discharge Summary    Date of Admission: 1/21/2021  Date of Discharge: No discharge date for patient encounter.  Discharging Attending: ERYN Anderson*   Discharging Senior Resident:        CHIEF COMPLAINT ON ADMISSION  No chief complaint on file.      Reason for Admission  ST elevation myocardial infarction    Admission Date  1/21/2021    CODE STATUS  Full Code    HPI & HOSPITAL COURSE  This is 45-year-old male with past medical history of RCA STEMI in April 2020 who presented to the hospital on 1/21 with sudden onset chest pressure/pain.  Patient has history of methamphetamine and chronic tobacco use.  He reported recent methamphetamine abuse and also use of sildenafil 1 night prior to admission.  EKG Performed by EMS showed ST elevation in the inferior leads.  On presentation, patient was hemodynamically stable.   Patient underwent emergent Left heart catheterization  with findings of acute inferior STEMI due to late stent thrombosis. Successful PCI of the RCA using 2 overlapping drug-eluting stents were placed. Patient was admitted to telemetry floor for post STEMI monitoring. Blood work was noted for mild HAYDEE which improved with fluid resuscitation.     Of note, patient reported that he was not taking dual antiplatelet therapy, metoprolol and lisinopril for past few months due to financial issues.  Patient remained stable during hospitalization. He denied any pain. Blood pressure was found to be elevated during hospitalization due to which antihypertensive  medications were uptitrated. He was also  evaluated by physical/Occupational Therapy who recommended no further needs upon discharge.     Patient is medically stable and can be discharged home. He will be discharged on dual antiplatelet therapy, high-dose atorvastatin, metoprolol 50 mg twice daily and lisinopril 20 mg once daily with close outpatient follow-up with cardiology. Patient was given all medications for 3 months supply prior to  discharge.     Therefore, he is discharged in fair and stable condition to home with close outpatient follow-up.    The patient met 2-midnight criteria for an inpatient stay at the time of discharge.    PHYSICAL EXAM ON DISCHARGE  Temp:  [35.8 °C (96.4 °F)-37.3 °C (99.2 °F)] 36.9 °C (98.4 °F)  Pulse:  [64-81] 64  Resp:  [16-18] 18  BP: (139-161)/() 139/94  SpO2:  [96 %-100 %] 99 %    Physical Exam  Constitutional:       Appearance: Normal appearance.   HENT:      Head: Atraumatic.      Nose: Nose normal.      Mouth/Throat:      Mouth: Mucous membranes are moist.   Eyes:      Extraocular Movements: Extraocular movements intact.      Pupils: Pupils are equal, round, and reactive to light.   Cardiovascular:      Rate and Rhythm: Normal rate and regular rhythm.      Pulses: Normal pulses.      Heart sounds: No murmur.   Pulmonary:      Effort: Pulmonary effort is normal. No respiratory distress.      Breath sounds: Normal breath sounds. No wheezing.   Abdominal:      General: Abdomen is flat. Bowel sounds are normal. There is no distension.      Palpations: Abdomen is soft.      Tenderness: There is no abdominal tenderness.   Musculoskeletal:      Right lower leg: No edema.      Left lower leg: No edema.   Skin:     General: Skin is warm.   Neurological:      General: No focal deficit present.      Mental Status: He is alert and oriented to person, place, and time. Mental status is at baseline.      Cranial Nerves: No cranial nerve deficit.      Sensory: No sensory deficit.      Motor: No weakness.   Psychiatric:         Mood and Affect: Mood normal.         Discharge Date  1/23/2021    FOLLOW UP ITEMS POST DISCHARGE  Follow with cardiology     DISCHARGE DIAGNOSES  Principal Problem:    STEMI (ST elevation myocardial infarction) (HCC) POA: Unknown  Active Problems:    Other hyperlipidemia POA: Yes    Essential hypertension POA: Yes    Hypokalemia POA: Unknown    HAYDEE (acute kidney injury) (HCC) POA:  Unknown  Resolved Problems:    * No resolved hospital problems. *      FOLLOW UP  Future Appointments   Date Time Provider Department Center   2/9/2021  4:00 PM STEVAN Mathias [facility] None     [facility]  [address]  [address]  [place] [id number] [phone]  Call  Your doctor has referred you to Cardiac Rehab, which is important to your recovery. Please call to make an appointment.      MEDICATIONS ON DISCHARGE     Medication List      CHANGE how you take these medications      Instructions   lisinopril 20 MG Tabs  Start taking on: January 24, 2021  What changed:   · medication strength  · how much to take  Commonly known as: PRINIVIL   Take 1 Tab by mouth every day.  Dose: 20 mg     metoprolol tartrate 50 MG Tabs  What changed:   · medication strength  · how much to take  Commonly known as: LOPRESSOR   Take 1 Tab by mouth 2 Times a Day.  Dose: 50 mg        CONTINUE taking these medications      Instructions   aspirin 81 MG EC tablet   Take 1 Tab by mouth every day.  Dose: 81 mg     atorvastatin 80 MG tablet  Commonly known as: LIPITOR   Take 1 Tab by mouth every evening.  Dose: 80 mg     famotidine 20 MG Tabs  Commonly known as: PEPCID   Take 1 Tab by mouth 2 times a day.  Dose: 20 mg     nitroglycerin 0.4 MG Subl  Commonly known as: NITROSTAT   Place 1 Tab under the tongue as needed for Chest Pain (up to 3 doses (if SBP greater than 90 mmHg)).  Dose: 0.4 mg     prasugrel 10 MG Tabs  Commonly known as: EFFIENT   Take 1 Tab by mouth every day.  Dose: 10 mg            Allergies  No Known Allergies    DIET  Orders Placed This Encounter   Procedures   • Diet Order Diet: Cardiac     Standing Status:   Standing     Number of Occurrences:   1     Order Specific Question:   Diet:     Answer:   Cardiac [6]       ACTIVITY  As tolerated.  Weight bearing as tolerated    CONSULTATIONS  Dr. nicolle doshi  with Cardiology consulted.  Treatment options were discussed and plan of  care agreed upon.    PROCEDURES   Left heart catheterization on 1/21/2021

## 2021-01-23 NOTE — PROGRESS NOTES
Received report from day staff. Assumed pt care. Patient is AOX4, denies any pain. POC discussed. Tele monitor in place. Call light within reach, bed in lowest position, and personal items accessible.

## 2021-01-23 NOTE — NON-PROVIDER
"*Medical Student Practice Note*     ID: Mr. Immanuel Borges is a 46 y/o M who presented to the ED via EMS with chest pain. ECG revealed inferior STEMI, and he was emergently taken to the cath lab which showed 100% in-stent occlusion of the right coronary artery. Two drug eluting stents were placed at the proximal RCA.      Interval Events  Mr. Borges has remains hypertensive, but his average readings have decreased from previous 24-hr interval. HAYDEE has resolved.      Subjective  Mr. Borges says he feels \"fine\" this morning and denies chest pain, headache, dizziness, numbness/tingling in extremities, chest pain, shortness of breath, swelling in legs, and N/V. His rhinorrhea has resolved from yesterday. He understands that he needs to maintain his medicine regimen upon discharge and acknowledges the risks if he does not.      ROS  *See HPI*  GI: has had BM  : no dysuria, no oliguria      PMH  Stent placement for RCA STEMI April 2020  HTN     Fam Hx  \"Heart issues\" in father, sister     Social Hx  Tobacco  Methamphetamine use     Objective  Vitals T: 99 P: 74 Resp: 18 O2: 96% on RA BP: 152/94     Physical Exam  General: Well appearing 46 y/o M who appears stated age  Cardiovascular: Normal S1, S2, regular rate and rhythm  Pulmonary: clear to auscultation bilaterally   Extremities: no peripheral edema      Labs  -Anemia  -Thrombocytopenia  -Hyponatremia  -Creatinine and GFR normalized  -INR, PT, APTT, ISTAT elevated  -UDS: positive for benzodiazepines, cannabinoids, and amphetamines     Assessment/Plan  Mr. Borges is a 46 y/o M w/ a PMH of HTN, RCA stent placement, and previous MI who was admitted and treated for an inferior STEMI. Two drug eluting stents were emergently placed in the RCA. Patient reports feeling better from yesterday. He has been hypertensive since admission to the telemetry floor. Labs show resolved HAYDEE. Patient is ready for discharge per clearance from cardiology.     #Inferior STEMI secondary to 100% RCA " occlusion  -Cardiology monitoring; 2 drug eluting stents placed in proximal RCA   -Place on atorvastatin, aspirin, prasugrel, and metoprolol therapy  -Educate patient on importance of outpatient adherence  -Consult OT/PT     #HTN  -Lisinopril 20mg qd  -Metoprolol 50mg bid      #HAYDEE  -Prerenal due to decreased perfusion most likely; contrast from Cath procedure could also worsen it  -Creatinine and GFR normalized today      #Hypokalemia  -Replenish until therapeutic goal of 4 is attained     #Methamphetamine Use  - on cessation   -Educate patient on cardiac risks    #Medication Eduction  -Patient counseled on importance of adhering to medications outpatient and understands he has to take them in order to prevent his stent from becoming occluded again.          Note Written by Rhonda Coffman MS3  Attending Physician Dr. Samy Ryan

## 2021-01-24 NOTE — DISCHARGE PLANNING
Meds-to-Beds: Discharge prescription orders listed below delivered to patient's bedside. RN notified. Written information regarding the dispensed prescriptions was provided to the patient including the phone number of the pharmacy to call for any questions.         Immanuel Borges   Home Medication Instructions [id number]    Printed on:01/23/21 1641   Medication Information                      aspirin 81 MG EC tablet  Take 1 Tab by mouth every day.             atorvastatin (LIPITOR) 80 MG tablet  Take 1 Tab by mouth every evening.             famotidine (PEPCID) 20 MG Tab  Take 1 Tab by mouth 2 times a day.             lisinopril (PRINIVIL) 20 MG Tab  Take 1 Tab by mouth every day.             metoprolol tartrate (LOPRESSOR) 50 MG Tab  Take 1 Tab by mouth 2 Times a Day.             nitroglycerin (NITROSTAT) 0.4 MG SL Tab  Place 1 Tab under the tongue as needed for Chest Pain (up to 3 doses (if SBP greater than 90 mmHg)).             prasugrel (EFFIENT) 10 MG Tab  Take 1 Tab by mouth every day.                 Emily Stroud, PharmD

## 2021-02-17 ENCOUNTER — HOSPITAL ENCOUNTER (INPATIENT)
Facility: MEDICAL CENTER | Age: 46
LOS: 1 days | DRG: 292 | End: 2021-02-19
Attending: EMERGENCY MEDICINE | Admitting: STUDENT IN AN ORGANIZED HEALTH CARE EDUCATION/TRAINING PROGRAM
Payer: MEDICAID

## 2021-02-17 ENCOUNTER — APPOINTMENT (OUTPATIENT)
Dept: RADIOLOGY | Facility: MEDICAL CENTER | Age: 46
DRG: 292 | End: 2021-02-17
Attending: EMERGENCY MEDICINE
Payer: MEDICAID

## 2021-02-17 DIAGNOSIS — E87.79 OTHER HYPERVOLEMIA: ICD-10-CM

## 2021-02-17 DIAGNOSIS — N17.9 AKI (ACUTE KIDNEY INJURY) (HCC): ICD-10-CM

## 2021-02-17 DIAGNOSIS — F15.10 AMPHETAMINE ABUSE (HCC): ICD-10-CM

## 2021-02-17 DIAGNOSIS — I16.9 HYPERTENSIVE CRISIS: ICD-10-CM

## 2021-02-17 PROBLEM — R79.89 ELEVATED BRAIN NATRIURETIC PEPTIDE (BNP) LEVEL: Status: ACTIVE | Noted: 2021-02-17

## 2021-02-17 PROBLEM — R06.00 DYSPNEA: Status: ACTIVE | Noted: 2021-02-17

## 2021-02-17 PROBLEM — I16.0 HYPERTENSIVE URGENCY: Status: ACTIVE | Noted: 2021-02-17

## 2021-02-17 PROBLEM — E87.6 HYPOKALEMIA: Status: RESOLVED | Noted: 2020-04-18 | Resolved: 2021-02-17

## 2021-02-17 LAB
ALBUMIN SERPL BCP-MCNC: 4.2 G/DL (ref 3.2–4.9)
ALBUMIN/GLOB SERPL: 1.2 G/DL
ALP SERPL-CCNC: 65 U/L (ref 30–99)
ALT SERPL-CCNC: 13 U/L (ref 2–50)
ANION GAP SERPL CALC-SCNC: 12 MMOL/L (ref 7–16)
AST SERPL-CCNC: 18 U/L (ref 12–45)
BASOPHILS # BLD AUTO: 0.4 % (ref 0–1.8)
BASOPHILS # BLD: 0.02 K/UL (ref 0–0.12)
BILIRUB SERPL-MCNC: 0.6 MG/DL (ref 0.1–1.5)
BUN SERPL-MCNC: 16 MG/DL (ref 8–22)
CALCIUM SERPL-MCNC: 9.4 MG/DL (ref 8.5–10.5)
CHLORIDE SERPL-SCNC: 103 MMOL/L (ref 96–112)
CO2 SERPL-SCNC: 21 MMOL/L (ref 20–33)
CREAT SERPL-MCNC: 1.34 MG/DL (ref 0.5–1.4)
EKG IMPRESSION: NORMAL
EOSINOPHIL # BLD AUTO: 0.01 K/UL (ref 0–0.51)
EOSINOPHIL NFR BLD: 0.2 % (ref 0–6.9)
ERYTHROCYTE [DISTWIDTH] IN BLOOD BY AUTOMATED COUNT: 44.9 FL (ref 35.9–50)
FLUAV RNA SPEC QL NAA+PROBE: NEGATIVE
FLUBV RNA SPEC QL NAA+PROBE: NEGATIVE
GLOBULIN SER CALC-MCNC: 3.5 G/DL (ref 1.9–3.5)
GLUCOSE SERPL-MCNC: 109 MG/DL (ref 65–99)
HCT VFR BLD AUTO: 44.7 % (ref 42–52)
HGB BLD-MCNC: 15.1 G/DL (ref 14–18)
IMM GRANULOCYTES # BLD AUTO: 0 K/UL (ref 0–0.11)
IMM GRANULOCYTES NFR BLD AUTO: 0 % (ref 0–0.9)
LYMPHOCYTES # BLD AUTO: 1.59 K/UL (ref 1–4.8)
LYMPHOCYTES NFR BLD: 29.8 % (ref 22–41)
MAGNESIUM SERPL-MCNC: 2.2 MG/DL (ref 1.5–2.5)
MCH RBC QN AUTO: 27.9 PG (ref 27–33)
MCHC RBC AUTO-ENTMCNC: 33.8 G/DL (ref 33.7–35.3)
MCV RBC AUTO: 82.5 FL (ref 81.4–97.8)
MONOCYTES # BLD AUTO: 0.23 K/UL (ref 0–0.85)
MONOCYTES NFR BLD AUTO: 4.3 % (ref 0–13.4)
NEUTROPHILS # BLD AUTO: 3.49 K/UL (ref 1.82–7.42)
NEUTROPHILS NFR BLD: 65.3 % (ref 44–72)
NRBC # BLD AUTO: 0 K/UL
NRBC BLD-RTO: 0 /100 WBC
NT-PROBNP SERPL IA-MCNC: 4418 PG/ML (ref 0–125)
PLATELET # BLD AUTO: 200 K/UL (ref 164–446)
PMV BLD AUTO: 11.3 FL (ref 9–12.9)
POTASSIUM SERPL-SCNC: 4.1 MMOL/L (ref 3.6–5.5)
PROT SERPL-MCNC: 7.7 G/DL (ref 6–8.2)
RBC # BLD AUTO: 5.42 M/UL (ref 4.7–6.1)
RSV RNA SPEC QL NAA+PROBE: NEGATIVE
SARS-COV-2 RNA RESP QL NAA+PROBE: NOTDETECTED
SODIUM SERPL-SCNC: 136 MMOL/L (ref 135–145)
SPECIMEN SOURCE: NORMAL
TROPONIN T SERPL-MCNC: 18 NG/L (ref 6–19)
TROPONIN T SERPL-MCNC: 19 NG/L (ref 6–19)
WBC # BLD AUTO: 5.3 K/UL (ref 4.8–10.8)

## 2021-02-17 PROCEDURE — 83880 ASSAY OF NATRIURETIC PEPTIDE: CPT

## 2021-02-17 PROCEDURE — 96375 TX/PRO/DX INJ NEW DRUG ADDON: CPT

## 2021-02-17 PROCEDURE — 700111 HCHG RX REV CODE 636 W/ 250 OVERRIDE (IP): Performed by: EMERGENCY MEDICINE

## 2021-02-17 PROCEDURE — A9270 NON-COVERED ITEM OR SERVICE: HCPCS | Performed by: STUDENT IN AN ORGANIZED HEALTH CARE EDUCATION/TRAINING PROGRAM

## 2021-02-17 PROCEDURE — 36415 COLL VENOUS BLD VENIPUNCTURE: CPT | Mod: XU

## 2021-02-17 PROCEDURE — 93005 ELECTROCARDIOGRAM TRACING: CPT | Performed by: EMERGENCY MEDICINE

## 2021-02-17 PROCEDURE — 80053 COMPREHEN METABOLIC PANEL: CPT

## 2021-02-17 PROCEDURE — 0241U HCHG SARS-COV-2 COVID-19 NFCT DS RESP RNA 4 TRGT MIC: CPT

## 2021-02-17 PROCEDURE — 96372 THER/PROPH/DIAG INJ SC/IM: CPT | Mod: XU

## 2021-02-17 PROCEDURE — 83735 ASSAY OF MAGNESIUM: CPT

## 2021-02-17 PROCEDURE — 71045 X-RAY EXAM CHEST 1 VIEW: CPT

## 2021-02-17 PROCEDURE — 84484 ASSAY OF TROPONIN QUANT: CPT | Mod: 91

## 2021-02-17 PROCEDURE — G0378 HOSPITAL OBSERVATION PER HR: HCPCS

## 2021-02-17 PROCEDURE — 700102 HCHG RX REV CODE 250 W/ 637 OVERRIDE(OP): Performed by: STUDENT IN AN ORGANIZED HEALTH CARE EDUCATION/TRAINING PROGRAM

## 2021-02-17 PROCEDURE — 85025 COMPLETE CBC W/AUTO DIFF WBC: CPT

## 2021-02-17 PROCEDURE — C9803 HOPD COVID-19 SPEC COLLECT: HCPCS | Performed by: EMERGENCY MEDICINE

## 2021-02-17 PROCEDURE — 99285 EMERGENCY DEPT VISIT HI MDM: CPT

## 2021-02-17 PROCEDURE — 99220 PR INITIAL OBSERVATION CARE,LEVL III: CPT | Performed by: STUDENT IN AN ORGANIZED HEALTH CARE EDUCATION/TRAINING PROGRAM

## 2021-02-17 PROCEDURE — 96374 THER/PROPH/DIAG INJ IV PUSH: CPT

## 2021-02-17 PROCEDURE — 700101 HCHG RX REV CODE 250: Performed by: EMERGENCY MEDICINE

## 2021-02-17 PROCEDURE — 700111 HCHG RX REV CODE 636 W/ 250 OVERRIDE (IP): Performed by: STUDENT IN AN ORGANIZED HEALTH CARE EDUCATION/TRAINING PROGRAM

## 2021-02-17 PROCEDURE — 86780 TREPONEMA PALLIDUM: CPT

## 2021-02-17 RX ORDER — FUROSEMIDE 10 MG/ML
40 INJECTION INTRAMUSCULAR; INTRAVENOUS ONCE
Status: COMPLETED | OUTPATIENT
Start: 2021-02-17 | End: 2021-02-17

## 2021-02-17 RX ORDER — AMOXICILLIN 250 MG
2 CAPSULE ORAL 2 TIMES DAILY
Status: DISCONTINUED | OUTPATIENT
Start: 2021-02-17 | End: 2021-02-19 | Stop reason: HOSPADM

## 2021-02-17 RX ORDER — FAMOTIDINE 20 MG/1
20 TABLET, FILM COATED ORAL 2 TIMES DAILY
Status: DISCONTINUED | OUTPATIENT
Start: 2021-02-17 | End: 2021-02-19 | Stop reason: HOSPADM

## 2021-02-17 RX ORDER — LABETALOL HYDROCHLORIDE 5 MG/ML
10 INJECTION, SOLUTION INTRAVENOUS EVERY 4 HOURS PRN
Status: DISCONTINUED | OUTPATIENT
Start: 2021-02-17 | End: 2021-02-18

## 2021-02-17 RX ORDER — POLYETHYLENE GLYCOL 3350 17 G/17G
1 POWDER, FOR SOLUTION ORAL
Status: DISCONTINUED | OUTPATIENT
Start: 2021-02-17 | End: 2021-02-19 | Stop reason: HOSPADM

## 2021-02-17 RX ORDER — BISACODYL 10 MG
10 SUPPOSITORY, RECTAL RECTAL
Status: DISCONTINUED | OUTPATIENT
Start: 2021-02-17 | End: 2021-02-19 | Stop reason: HOSPADM

## 2021-02-17 RX ORDER — METOPROLOL TARTRATE 50 MG/1
50 TABLET, FILM COATED ORAL 2 TIMES DAILY
Status: DISCONTINUED | OUTPATIENT
Start: 2021-02-17 | End: 2021-02-19 | Stop reason: HOSPADM

## 2021-02-17 RX ORDER — LISINOPRIL 20 MG/1
20 TABLET ORAL DAILY
Status: DISCONTINUED | OUTPATIENT
Start: 2021-02-18 | End: 2021-02-19 | Stop reason: HOSPADM

## 2021-02-17 RX ORDER — LABETALOL HYDROCHLORIDE 5 MG/ML
10 INJECTION, SOLUTION INTRAVENOUS ONCE
Status: COMPLETED | OUTPATIENT
Start: 2021-02-17 | End: 2021-02-17

## 2021-02-17 RX ORDER — ATORVASTATIN CALCIUM 80 MG/1
80 TABLET, FILM COATED ORAL EVERY EVENING
Status: DISCONTINUED | OUTPATIENT
Start: 2021-02-17 | End: 2021-02-19 | Stop reason: HOSPADM

## 2021-02-17 RX ORDER — PRASUGREL 10 MG/1
10 TABLET, FILM COATED ORAL DAILY
Status: DISCONTINUED | OUTPATIENT
Start: 2021-02-18 | End: 2021-02-19 | Stop reason: HOSPADM

## 2021-02-17 RX ORDER — ENALAPRILAT 1.25 MG/ML
1.25 INJECTION INTRAVENOUS ONCE
Status: COMPLETED | OUTPATIENT
Start: 2021-02-17 | End: 2021-02-17

## 2021-02-17 RX ADMIN — FAMOTIDINE 20 MG: 20 TABLET ORAL at 17:13

## 2021-02-17 RX ADMIN — LABETALOL HYDROCHLORIDE 10 MG: 5 INJECTION, SOLUTION INTRAVENOUS at 10:43

## 2021-02-17 RX ADMIN — ENALAPRILAT 1.25 MG: 1.25 INJECTION INTRAVENOUS at 11:14

## 2021-02-17 RX ADMIN — FUROSEMIDE 40 MG: 10 INJECTION, SOLUTION INTRAMUSCULAR; INTRAVENOUS at 14:47

## 2021-02-17 RX ADMIN — METOPROLOL TARTRATE 50 MG: 50 TABLET, FILM COATED ORAL at 17:13

## 2021-02-17 RX ADMIN — ENOXAPARIN SODIUM 40 MG: 40 INJECTION SUBCUTANEOUS at 14:47

## 2021-02-17 RX ADMIN — FUROSEMIDE 40 MG: 10 INJECTION, SOLUTION INTRAMUSCULAR; INTRAVENOUS at 11:14

## 2021-02-17 RX ADMIN — ATORVASTATIN CALCIUM 80 MG: 80 TABLET, FILM COATED ORAL at 17:13

## 2021-02-17 ASSESSMENT — ENCOUNTER SYMPTOMS
VOMITING: 0
COUGH: 0
DOUBLE VISION: 0
HEADACHES: 0
CHILLS: 0
DIZZINESS: 0
SHORTNESS OF BREATH: 1
DEPRESSION: 0
NERVOUS/ANXIOUS: 1
MYALGIAS: 0
BLURRED VISION: 0
NAUSEA: 0
ABDOMINAL PAIN: 0
FEVER: 0
SORE THROAT: 0

## 2021-02-17 ASSESSMENT — LIFESTYLE VARIABLES
DO YOU DRINK ALCOHOL: NO
EVER FELT BAD OR GUILTY ABOUT YOUR DRINKING: NO
TOTAL SCORE: 0
HAVE YOU EVER FELT YOU SHOULD CUT DOWN ON YOUR DRINKING: NO
EVER HAD A DRINK FIRST THING IN THE MORNING TO STEADY YOUR NERVES TO GET RID OF A HANGOVER: NO
HOW MANY TIMES IN THE PAST YEAR HAVE YOU HAD 5 OR MORE DRINKS IN A DAY: 1
CONSUMPTION TOTAL: POSITIVE
TOTAL SCORE: 0
EVER FELT BAD OR GUILTY ABOUT YOUR DRINKING: NO
EVER HAD A DRINK FIRST THING IN THE MORNING TO STEADY YOUR NERVES TO GET RID OF A HANGOVER: NO
TOTAL SCORE: 0
HAVE PEOPLE ANNOYED YOU BY CRITICIZING YOUR DRINKING: NO
HAVE PEOPLE ANNOYED YOU BY CRITICIZING YOUR DRINKING: NO
DOES PATIENT WANT TO STOP DRINKING: NO
TOTAL SCORE: 0
TOTAL SCORE: 0
HAVE YOU EVER FELT YOU SHOULD CUT DOWN ON YOUR DRINKING: NO
TOTAL SCORE: 0
AVERAGE NUMBER OF DAYS PER WEEK YOU HAVE A DRINK CONTAINING ALCOHOL: 1
ON A TYPICAL DAY WHEN YOU DRINK ALCOHOL HOW MANY DRINKS DO YOU HAVE: 1
SUBSTANCE_ABUSE: 1
CONSUMPTION TOTAL: INCOMPLETE
ALCOHOL_USE: YES
ALCOHOL_USE: YES

## 2021-02-17 ASSESSMENT — FIBROSIS 4 INDEX
FIB4 SCORE: 1.17
FIB4 SCORE: 2.79
FIB4 SCORE: 1.17

## 2021-02-17 ASSESSMENT — COGNITIVE AND FUNCTIONAL STATUS - GENERAL
MOBILITY SCORE: 24
SUGGESTED CMS G CODE MODIFIER DAILY ACTIVITY: CH
DAILY ACTIVITIY SCORE: 24
SUGGESTED CMS G CODE MODIFIER MOBILITY: CH

## 2021-02-17 ASSESSMENT — PATIENT HEALTH QUESTIONNAIRE - PHQ9
1. LITTLE INTEREST OR PLEASURE IN DOING THINGS: NOT AT ALL
SUM OF ALL RESPONSES TO PHQ9 QUESTIONS 1 AND 2: 0
2. FEELING DOWN, DEPRESSED, IRRITABLE, OR HOPELESS: NOT AT ALL

## 2021-02-17 NOTE — ED NOTES
Med rec completed per Pt and Pt's spouse at bedside with medication bottles provided by Pt. RX bottles reviewed and returned.  Pt reports that he took all of his medications aside from nitroglycerin this morning before coming to the ER.  Allergies reviewed with Pt. No known drug allergies.  No oral antibiotics in last 14 days.  Pt takes PRASUGREL and ASPIRIN.
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
## 2021-02-17 NOTE — ASSESSMENT & PLAN NOTE
Multifactorial: Has likely CHF, as well as underlying CAD, HAYDEE. May have underlying pulmonary hypertension and BHAVESH. Euvolemic, CXR shows no signs of volume overload.
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
## 2021-02-17 NOTE — ASSESSMENT & PLAN NOTE
inferior wall STEMI April 2020 s/p RCA BRIAN x1, inferior wall STEMI Jan 2021 s/p PCI w/ RCA BRIAN x 2 after stent rethrombosis secondary to medical noncompliance. ECG shows no new changes, troponin wnl.   -continue DAPT: ASA and Effient  -continue Metoprolol, Atorvastatin  -hold Lisinopril due to HAYDEE
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
## 2021-02-17 NOTE — ED NOTES
Called report to Tele RN. Pt is aware of POC. Pt belongings are on bed. Pt transported to floor on monitors.

## 2021-02-18 ENCOUNTER — APPOINTMENT (OUTPATIENT)
Dept: CARDIOLOGY | Facility: MEDICAL CENTER | Age: 46
DRG: 292 | End: 2021-02-18
Attending: GENERAL PRACTICE
Payer: MEDICAID

## 2021-02-18 PROBLEM — F15.10 METHAMPHETAMINE ABUSE (HCC): Status: ACTIVE | Noted: 2021-02-18

## 2021-02-18 PROBLEM — R21 RASH OF BODY: Status: ACTIVE | Noted: 2021-02-18

## 2021-02-18 PROBLEM — N17.9 AKI (ACUTE KIDNEY INJURY) (HCC): Status: ACTIVE | Noted: 2021-02-18

## 2021-02-18 PROBLEM — F17.200 TOBACCO DEPENDENCE: Status: ACTIVE | Noted: 2021-02-18

## 2021-02-18 LAB
AMPHET UR QL SCN: POSITIVE
ANION GAP SERPL CALC-SCNC: 14 MMOL/L (ref 7–16)
BARBITURATES UR QL SCN: NEGATIVE
BASOPHILS # BLD AUTO: 0.5 % (ref 0–1.8)
BASOPHILS # BLD: 0.03 K/UL (ref 0–0.12)
BENZODIAZ UR QL SCN: NEGATIVE
BUN SERPL-MCNC: 26 MG/DL (ref 8–22)
BZE UR QL SCN: NEGATIVE
CALCIUM SERPL-MCNC: 9.5 MG/DL (ref 8.5–10.5)
CANNABINOIDS UR QL SCN: NEGATIVE
CHLORIDE SERPL-SCNC: 99 MMOL/L (ref 96–112)
CO2 SERPL-SCNC: 21 MMOL/L (ref 20–33)
CREAT SERPL-MCNC: 1.89 MG/DL (ref 0.5–1.4)
CREAT UR-MCNC: 239.18 MG/DL
EOSINOPHIL # BLD AUTO: 0.01 K/UL (ref 0–0.51)
EOSINOPHIL NFR BLD: 0.2 % (ref 0–6.9)
ERYTHROCYTE [DISTWIDTH] IN BLOOD BY AUTOMATED COUNT: 43.9 FL (ref 35.9–50)
GLUCOSE SERPL-MCNC: 121 MG/DL (ref 65–99)
HCT VFR BLD AUTO: 49.8 % (ref 42–52)
HGB BLD-MCNC: 17.1 G/DL (ref 14–18)
IMM GRANULOCYTES # BLD AUTO: 0.01 K/UL (ref 0–0.11)
IMM GRANULOCYTES NFR BLD AUTO: 0.2 % (ref 0–0.9)
LYMPHOCYTES # BLD AUTO: 2.58 K/UL (ref 1–4.8)
LYMPHOCYTES NFR BLD: 47 % (ref 22–41)
MCH RBC QN AUTO: 27.9 PG (ref 27–33)
MCHC RBC AUTO-ENTMCNC: 34.3 G/DL (ref 33.7–35.3)
MCV RBC AUTO: 81.4 FL (ref 81.4–97.8)
METHADONE UR QL SCN: NEGATIVE
MONOCYTES # BLD AUTO: 0.42 K/UL (ref 0–0.85)
MONOCYTES NFR BLD AUTO: 7.7 % (ref 0–13.4)
NEUTROPHILS # BLD AUTO: 2.44 K/UL (ref 1.82–7.42)
NEUTROPHILS NFR BLD: 44.4 % (ref 44–72)
NRBC # BLD AUTO: 0 K/UL
NRBC BLD-RTO: 0 /100 WBC
OPIATES UR QL SCN: NEGATIVE
OXYCODONE UR QL SCN: NEGATIVE
PCP UR QL SCN: NEGATIVE
PLATELET # BLD AUTO: 201 K/UL (ref 164–446)
PMV BLD AUTO: 11.5 FL (ref 9–12.9)
POTASSIUM SERPL-SCNC: 3.8 MMOL/L (ref 3.6–5.5)
PROPOXYPH UR QL SCN: NEGATIVE
RBC # BLD AUTO: 6.12 M/UL (ref 4.7–6.1)
SODIUM SERPL-SCNC: 134 MMOL/L (ref 135–145)
SODIUM UR-SCNC: 58 MMOL/L
TREPONEMA PALLIDUM IGG+IGM AB [PRESENCE] IN SERUM OR PLASMA BY IMMUNOASSAY: NORMAL
WBC # BLD AUTO: 5.5 K/UL (ref 4.8–10.8)

## 2021-02-18 PROCEDURE — A9270 NON-COVERED ITEM OR SERVICE: HCPCS | Performed by: STUDENT IN AN ORGANIZED HEALTH CARE EDUCATION/TRAINING PROGRAM

## 2021-02-18 PROCEDURE — A9270 NON-COVERED ITEM OR SERVICE: HCPCS | Performed by: GENERAL PRACTICE

## 2021-02-18 PROCEDURE — 80307 DRUG TEST PRSMV CHEM ANLYZR: CPT

## 2021-02-18 PROCEDURE — 93306 TTE W/DOPPLER COMPLETE: CPT

## 2021-02-18 PROCEDURE — 700102 HCHG RX REV CODE 250 W/ 637 OVERRIDE(OP): Performed by: STUDENT IN AN ORGANIZED HEALTH CARE EDUCATION/TRAINING PROGRAM

## 2021-02-18 PROCEDURE — 770020 HCHG ROOM/CARE - TELE (206)

## 2021-02-18 PROCEDURE — 85025 COMPLETE CBC W/AUTO DIFF WBC: CPT

## 2021-02-18 PROCEDURE — 82570 ASSAY OF URINE CREATININE: CPT | Mod: XU

## 2021-02-18 PROCEDURE — 99232 SBSQ HOSP IP/OBS MODERATE 35: CPT | Mod: GC | Performed by: HOSPITALIST

## 2021-02-18 PROCEDURE — 84300 ASSAY OF URINE SODIUM: CPT

## 2021-02-18 PROCEDURE — 700102 HCHG RX REV CODE 250 W/ 637 OVERRIDE(OP): Performed by: GENERAL PRACTICE

## 2021-02-18 PROCEDURE — 80048 BASIC METABOLIC PNL TOTAL CA: CPT

## 2021-02-18 RX ORDER — POTASSIUM CHLORIDE 20 MEQ/1
20 TABLET, EXTENDED RELEASE ORAL ONCE
Status: COMPLETED | OUTPATIENT
Start: 2021-02-18 | End: 2021-02-18

## 2021-02-18 RX ADMIN — FAMOTIDINE 20 MG: 20 TABLET ORAL at 05:29

## 2021-02-18 RX ADMIN — ASPIRIN 81 MG: 81 TABLET, COATED ORAL at 05:29

## 2021-02-18 RX ADMIN — FAMOTIDINE 20 MG: 20 TABLET ORAL at 16:51

## 2021-02-18 RX ADMIN — PRASUGREL 10 MG: 10 TABLET, FILM COATED ORAL at 05:29

## 2021-02-18 RX ADMIN — LISINOPRIL 20 MG: 20 TABLET ORAL at 05:29

## 2021-02-18 RX ADMIN — METOPROLOL TARTRATE 50 MG: 50 TABLET, FILM COATED ORAL at 05:29

## 2021-02-18 RX ADMIN — POTASSIUM CHLORIDE 20 MEQ: 1500 TABLET, EXTENDED RELEASE ORAL at 09:45

## 2021-02-18 RX ADMIN — ATORVASTATIN CALCIUM 80 MG: 80 TABLET, FILM COATED ORAL at 16:51

## 2021-02-18 RX ADMIN — METOPROLOL TARTRATE 50 MG: 50 TABLET, FILM COATED ORAL at 16:51

## 2021-02-18 ASSESSMENT — ENCOUNTER SYMPTOMS
NAUSEA: 0
VOMITING: 0
DIZZINESS: 0
CONSTITUTIONAL NEGATIVE: 1
MYALGIAS: 0
DIARRHEA: 0
DEPRESSION: 1
EYES NEGATIVE: 1
NERVOUS/ANXIOUS: 1
WEIGHT LOSS: 0
BLURRED VISION: 0
PALPITATIONS: 0
DOUBLE VISION: 0
ORTHOPNEA: 0
ABDOMINAL PAIN: 0
MUSCULOSKELETAL NEGATIVE: 1
HEADACHES: 0
COUGH: 0
SHORTNESS OF BREATH: 0
HALLUCINATIONS: 0
INSOMNIA: 0
CHILLS: 0
FEVER: 0
WEAKNESS: 0
RESPIRATORY NEGATIVE: 1
PND: 1
NEUROLOGICAL NEGATIVE: 1
GASTROINTESTINAL NEGATIVE: 1

## 2021-02-18 ASSESSMENT — PAIN DESCRIPTION - PAIN TYPE: TYPE: ACUTE PAIN

## 2021-02-18 ASSESSMENT — FIBROSIS 4 INDEX: FIB4 SCORE: 1.17

## 2021-02-18 NOTE — ASSESSMENT & PLAN NOTE
-encourage tobacco cessation  -patient declined pharmacological intervention. Does not want to quit at this time.
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
## 2021-02-18 NOTE — PROGRESS NOTES
Bedside report received. Bed locked and in low position, call light within reach. Hourly rounding in place.Patient denies any pain or discomfort. No further needs at this time.
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
## 2021-02-18 NOTE — PROGRESS NOTES
2 RN skin check complete with ADAM Romero.   Devices in place PIV and Tele Box.  Skin assessed under devices intact.  Confirmed pressure ulcers found on None.  New potential pressure ulcers noted on NA. Wound consult placed NA.  The following interventions in place pt turns independently from side to side, pillows in place for support, moisturizer in use.     Bilat ears intact, pink and blanching  Bilat Elbows intact, dry, pink and blanching   Sacrum intact, pink and blanching   Bilat heels intact, dry, and calloused, slow to lorena.

## 2021-02-18 NOTE — ASSESSMENT & PLAN NOTE
Suspect pre-renal, FENA 0.3%.  -hold Lisinopril  -repeat BMP tomorrow, if peaked or downtrending, will discharge home

## 2021-02-19 ENCOUNTER — PHARMACY VISIT (OUTPATIENT)
Dept: PHARMACY | Facility: MEDICAL CENTER | Age: 46
End: 2021-02-19
Payer: COMMERCIAL

## 2021-02-19 VITALS
DIASTOLIC BLOOD PRESSURE: 100 MMHG | OXYGEN SATURATION: 99 % | TEMPERATURE: 97.2 F | SYSTOLIC BLOOD PRESSURE: 149 MMHG | RESPIRATION RATE: 16 BRPM | WEIGHT: 153.22 LBS | HEART RATE: 72 BPM | HEIGHT: 67 IN | BODY MASS INDEX: 24.05 KG/M2

## 2021-02-19 LAB
ANION GAP SERPL CALC-SCNC: 12 MMOL/L (ref 7–16)
BUN SERPL-MCNC: 30 MG/DL (ref 8–22)
CALCIUM SERPL-MCNC: 9.2 MG/DL (ref 8.5–10.5)
CHLORIDE SERPL-SCNC: 100 MMOL/L (ref 96–112)
CO2 SERPL-SCNC: 22 MMOL/L (ref 20–33)
CREAT SERPL-MCNC: 1.53 MG/DL (ref 0.5–1.4)
GLUCOSE SERPL-MCNC: 119 MG/DL (ref 65–99)
LV EJECT FRACT  99904: 30
LV EJECT FRACT MOD 2C 99903: 28.34
LV EJECT FRACT MOD 4C 99902: 32.35
LV EJECT FRACT MOD BP 99901: 29.26
POTASSIUM SERPL-SCNC: 3.7 MMOL/L (ref 3.6–5.5)
SODIUM SERPL-SCNC: 134 MMOL/L (ref 135–145)

## 2021-02-19 PROCEDURE — 80048 BASIC METABOLIC PNL TOTAL CA: CPT

## 2021-02-19 PROCEDURE — 99239 HOSP IP/OBS DSCHRG MGMT >30: CPT | Mod: GC | Performed by: HOSPITALIST

## 2021-02-19 PROCEDURE — A9270 NON-COVERED ITEM OR SERVICE: HCPCS | Performed by: GENERAL PRACTICE

## 2021-02-19 PROCEDURE — A9270 NON-COVERED ITEM OR SERVICE: HCPCS | Performed by: STUDENT IN AN ORGANIZED HEALTH CARE EDUCATION/TRAINING PROGRAM

## 2021-02-19 PROCEDURE — RXMED WILLOW AMBULATORY MEDICATION CHARGE: Performed by: GENERAL PRACTICE

## 2021-02-19 PROCEDURE — 93306 TTE W/DOPPLER COMPLETE: CPT | Mod: 26 | Performed by: INTERNAL MEDICINE

## 2021-02-19 PROCEDURE — 700102 HCHG RX REV CODE 250 W/ 637 OVERRIDE(OP): Performed by: GENERAL PRACTICE

## 2021-02-19 PROCEDURE — 700102 HCHG RX REV CODE 250 W/ 637 OVERRIDE(OP): Performed by: STUDENT IN AN ORGANIZED HEALTH CARE EDUCATION/TRAINING PROGRAM

## 2021-02-19 RX ORDER — ATORVASTATIN CALCIUM 80 MG/1
80 TABLET, FILM COATED ORAL EVERY EVENING
Qty: 90 TABLET | Refills: 1 | Status: SHIPPED | OUTPATIENT
Start: 2021-02-19 | End: 2021-02-23 | Stop reason: SDUPTHER

## 2021-02-19 RX ORDER — LISINOPRIL 10 MG/1
10 TABLET ORAL DAILY
Qty: 90 TABLET | Refills: 0 | Status: SHIPPED | OUTPATIENT
Start: 2021-02-19 | End: 2021-02-23

## 2021-02-19 RX ORDER — NITROGLYCERIN 0.4 MG/1
0.4 TABLET SUBLINGUAL PRN
Qty: 25 TABLET | Refills: 2 | Status: SHIPPED | OUTPATIENT
Start: 2021-02-19 | End: 2021-03-21

## 2021-02-19 RX ORDER — ASPIRIN 81 MG/1
81 TABLET ORAL DAILY
Qty: 90 TABLET | Refills: 1 | Status: SHIPPED | OUTPATIENT
Start: 2021-02-19 | End: 2021-05-20

## 2021-02-19 RX ORDER — METOPROLOL SUCCINATE 100 MG/1
100 TABLET, EXTENDED RELEASE ORAL DAILY
Qty: 90 TABLET | Refills: 1 | Status: SHIPPED | OUTPATIENT
Start: 2021-02-19 | End: 2021-02-23

## 2021-02-19 RX ORDER — POTASSIUM CHLORIDE 20 MEQ/1
40 TABLET, EXTENDED RELEASE ORAL ONCE
Status: COMPLETED | OUTPATIENT
Start: 2021-02-19 | End: 2021-02-19

## 2021-02-19 RX ORDER — SPIRONOLACTONE 25 MG/1
25 TABLET ORAL DAILY
Qty: 30 TABLET | Refills: 2 | Status: SHIPPED | OUTPATIENT
Start: 2021-02-19 | End: 2021-03-21

## 2021-02-19 RX ORDER — FAMOTIDINE 20 MG/1
20 TABLET, FILM COATED ORAL 2 TIMES DAILY
Qty: 60 TABLET | Refills: 2 | Status: SHIPPED | OUTPATIENT
Start: 2021-02-19 | End: 2021-03-21

## 2021-02-19 RX ORDER — PRASUGREL 10 MG/1
10 TABLET, FILM COATED ORAL DAILY
Qty: 90 TABLET | Refills: 1 | Status: SHIPPED | OUTPATIENT
Start: 2021-02-20 | End: 2021-05-21

## 2021-02-19 RX ADMIN — FAMOTIDINE 20 MG: 20 TABLET ORAL at 06:00

## 2021-02-19 RX ADMIN — PRASUGREL 10 MG: 10 TABLET, FILM COATED ORAL at 06:00

## 2021-02-19 RX ADMIN — METOPROLOL TARTRATE 50 MG: 50 TABLET, FILM COATED ORAL at 06:00

## 2021-02-19 RX ADMIN — ASPIRIN 81 MG: 81 TABLET, COATED ORAL at 06:00

## 2021-02-19 RX ADMIN — POTASSIUM CHLORIDE 40 MEQ: 1500 TABLET, EXTENDED RELEASE ORAL at 09:17

## 2021-02-19 ASSESSMENT — PAIN DESCRIPTION - PAIN TYPE: TYPE: ACUTE PAIN

## 2021-02-19 NOTE — CARE PLAN
Problem: Venous Thromboembolism (VTW)/Deep Vein Thrombosis (DVT) Prevention:  Goal: Patient will participate in Venous Thrombosis (VTE)/Deep Vein Thrombosis (DVT)Prevention Measures  Outcome: MET     Problem: Bowel/Gastric:  Goal: Normal bowel function is maintained or improved  Outcome: MET  Goal: Will not experience complications related to bowel motility  Outcome: MET     Problem: Knowledge Deficit  Goal: Knowledge of disease process/condition, treatment plan, diagnostic tests, and medications will improve  Outcome: MET  Goal: Knowledge of the prescribed therapeutic regimen will improve  Outcome: MET

## 2021-02-19 NOTE — DISCHARGE PLANNING
Meds-to-Beds: Discharge prescription orders listed below delivered to patient at discharge kristie. RN notified. Patient counseled.       Immanuel Borges   Home Medication Instructions [id number]    Printed on:02/19/21 4182   Medication Information                      aspirin 81 MG EC tablet  Take 1 Tab by mouth every day.             atorvastatin (LIPITOR) 80 MG tablet  Take 1 tablet by mouth every evening.             famotidine (PEPCID) 20 MG Tab  Take 1 Tab by mouth 2 times a day.             lisinopril (PRINIVIL) 10 MG Tab  Take 1 tablet by mouth every day.             metoprolol SR (TOPROL XL) 100 MG TABLET SR 24 HR  Take 1 tablet by mouth every day for 30 days.             nitroglycerin (NITROSTAT) 0.4 MG SL Tab  Place 1 tablet under the tongue as needed for Chest Pain (may take one tab every five minutes up to three times in 15 minutes. if chest pain not resolved, call 911 or go to the emergency room immediately.) for up to 30 days.             prasugrel (EFFIENT) 10 MG Tab  Take 1 Tab by mouth every day.             spironolactone (ALDACTONE) 25 MG Tab  Take 1 tablet by mouth every day for 30 days.                 Radha Duque, PharmD

## 2021-02-19 NOTE — DISCHARGE INSTRUCTIONS
"-Stop taking Metoprolol tartrate  -start taking Metoprolol succinate 100mg by mouth once daily for the heart  -start taking Aldactone 12.5mg by mouth once daily for the heart  -restart Lisinopril but at a lose dose of 10mg per day  -continue to take all other home medications to include Aspirin, Prasugrel, Atorvastatin, Famotidine, and Nitroglycerin as prescribed  -get a scale at home and weigh yourself daily in a log  -limit sodium intake daily to less than 2grams daily  -will have a heart failure clinic appointment in one week with repeat kidney function blood test  -will be given information on setting up with a new primary care provider  -return to the hospital/emergency room if having severe chest pain not responsive to nitroglycerin or gain more than 5 pounds in three days or having severe shortness of breath at rest with with exertion.        Managing Your Hypertension  Hypertension is commonly called high blood pressure. This is when the force of your blood pressing against the walls of your arteries is too strong. Arteries are blood vessels that carry blood from your heart throughout your body. Hypertension forces the heart to work harder to pump blood, and may cause the arteries to become narrow or stiff. Having untreated or uncontrolled hypertension can cause heart attack, stroke, kidney disease, and other problems.  What are blood pressure readings?  A blood pressure reading consists of a higher number over a lower number. Ideally, your blood pressure should be below 120/80. The first (\"top\") number is called the systolic pressure. It is a measure of the pressure in your arteries as your heart beats. The second (\"bottom\") number is called the diastolic pressure. It is a measure of the pressure in your arteries as the heart relaxes.  What does my blood pressure reading mean?  Blood pressure is classified into four stages. Based on your blood pressure reading, your health care provider may use the " following stages to determine what type of treatment you need, if any. Systolic pressure and diastolic pressure are measured in a unit called mm Hg.  Normal  · Systolic pressure: below 120.  · Diastolic pressure: below 80.  Elevated  · Systolic pressure: 120-129.  · Diastolic pressure: below 80.  Hypertension stage 1  · Systolic pressure: 130-139.  · Diastolic pressure: 80-89.  Hypertension stage 2  · Systolic pressure: 140 or above.  · Diastolic pressure: 90 or above.  What health risks are associated with hypertension?  Managing your hypertension is an important responsibility. Uncontrolled hypertension can lead to:  · A heart attack.  · A stroke.  · A weakened blood vessel (aneurysm).  · Heart failure.  · Kidney damage.  · Eye damage.  · Metabolic syndrome.  · Memory and concentration problems.  What changes can I make to manage my hypertension?  Hypertension can be managed by making lifestyle changes and possibly by taking medicines. Your health care provider will help you make a plan to bring your blood pressure within a normal range.  Eating and drinking    · Eat a diet that is high in fiber and potassium, and low in salt (sodium), added sugar, and fat. An example eating plan is called the DASH (Dietary Approaches to Stop Hypertension) diet. To eat this way:  ? Eat plenty of fresh fruits and vegetables. Try to fill half of your plate at each meal with fruits and vegetables.  ? Eat whole grains, such as whole wheat pasta, brown rice, or whole grain bread. Fill about one quarter of your plate with whole grains.  ? Eat low-fat diary products.  ? Avoid fatty cuts of meat, processed or cured meats, and poultry with skin. Fill about one quarter of your plate with lean proteins such as fish, chicken without skin, beans, eggs, and tofu.  ? Avoid premade and processed foods. These tend to be higher in sodium, added sugar, and fat.  · Reduce your daily sodium intake. Most people with hypertension should eat less than  1,500 mg of sodium a day.  · Limit alcohol intake to no more than 1 drink a day for nonpregnant women and 2 drinks a day for men. One drink equals 12 oz of beer, 5 oz of wine, or 1½ oz of hard liquor.  Lifestyle  · Work with your health care provider to maintain a healthy body weight, or to lose weight. Ask what an ideal weight is for you.  · Get at least 30 minutes of exercise that causes your heart to beat faster (aerobic exercise) most days of the week. Activities may include walking, swimming, or biking.  · Include exercise to strengthen your muscles (resistance exercise), such as weight lifting, as part of your weekly exercise routine. Try to do these types of exercises for 30 minutes at least 3 days a week.  · Do not use any products that contain nicotine or tobacco, such as cigarettes and e-cigarettes. If you need help quitting, ask your health care provider.  · Control any long-term (chronic) conditions you have, such as high cholesterol or diabetes.  Monitoring  · Monitor your blood pressure at home as told by your health care provider. Your personal target blood pressure may vary depending on your medical conditions, your age, and other factors.  · Have your blood pressure checked regularly, as often as told by your health care provider.  Working with your health care provider  · Review all the medicines you take with your health care provider because there may be side effects or interactions.  · Talk with your health care provider about your diet, exercise habits, and other lifestyle factors that may be contributing to hypertension.  · Visit your health care provider regularly. Your health care provider can help you create and adjust your plan for managing hypertension.  Will I need medicine to control my blood pressure?  Your health care provider may prescribe medicine if lifestyle changes are not enough to get your blood pressure under control, and if:  · Your systolic blood pressure is 130 or  higher.  · Your diastolic blood pressure is 80 or higher.  Take medicines only as told by your health care provider. Follow the directions carefully. Blood pressure medicines must be taken as prescribed. The medicine does not work as well when you skip doses. Skipping doses also puts you at risk for problems.  Contact a health care provider if:  · You think you are having a reaction to medicines you have taken.  · You have repeated (recurrent) headaches.  · You feel dizzy.  · You have swelling in your ankles.  · You have trouble with your vision.  Get help right away if:  · You develop a severe headache or confusion.  · You have unusual weakness or numbness, or you feel faint.  · You have severe pain in your chest or abdomen.  · You vomit repeatedly.  · You have trouble breathing.  Summary  · Hypertension is when the force of blood pumping through your arteries is too strong. If this condition is not controlled, it may put you at risk for serious complications.  · Your personal target blood pressure may vary depending on your medical conditions, your age, and other factors. For most people, a normal blood pressure is less than 120/80.  · Hypertension is managed by lifestyle changes, medicines, or both. Lifestyle changes include weight loss, eating a healthy, low-sodium diet, exercising more, and limiting alcohol.  This information is not intended to replace advice given to you by your health care provider. Make sure you discuss any questions you have with your health care provider.  Document Released: 09/11/2013 Document Revised: 04/10/2020 Document Reviewed: 11/15/2017  [employer] Patient Education © 2020 [employer]        Stimulant Use Disorder-Methamphetamines  Methamphetamines belong to a group of powerful drugs known as stimulants. Common street names for methamphetamine are meth, speed, crystal, ice, glass, and chalk. Methamphetamines have some medical uses, but they are often misused because of the effects  that they produce. These effects include:  · A feeling of extreme pleasure (euphoria).  · Alertness.  · A high energy level.  · Increased sexuality.  Stimulant use disorder is when your stimulant use disrupts your daily life. It may disrupt your relationships and how you do your job. Stimulant use disorder can be dangerous. Methamphetamines increase your blood pressure and heart rate. Using them can lead to a heart attack or stroke. Methamphetamines can also make your heart rate irregular and cause seizures. These problems can lead to death.  What are the causes?  This condition is caused by misusing methamphetamines for a period of time, such as by taking them for reasons other than to treat a diagnosed problem. Many people start using methamphetamine because they make them feel good. Over time, they get addicted to them. When they try to stop using it, they feel sick.  Methamphetamines work fast, and the good feelings that they produce go away quickly. As a result, people often binge on the drug and take multiple doses over short periods of time.  What increases the risk?  This condition is more likely to develop in people who:  · Misuse other drugs.  · Have problems with mood or behavior.  What are the signs or symptoms?  Symptoms of this condition include:  · Using greater amounts of a methamphetamine than you want to, or using a methamphetamine for longer than you want to.  · Trying several times to use less of a methamphetamine or to control your methamphetamine use.  · Craving methamphetamines.  · Spending a lot of time getting methamphetamines, using them, or recovering from their effects.  · Having problems at work, at school, at home, or in relationships because of methamphetamine use.  · Giving up or cutting down on important life activities because of methamphetamine use.  · Using methamphetamines when it is dangerous, such as when driving a car.  · Continuing to use methamphetamines even though they are  causing or have led to a physical problem, such as:  ? Extreme weight loss.  ? Malnutrition.  ? Jaw clenching.  ? Severe dental problems.  ? Lung problems.  ? Skin sores.  ? An infection, such as hepatitis or HIV (human immunodeficiency virus).  · Continuing to use methamphetamines even though they are causing a mental problem, such as:  ? Memory problems.  ? Seeing or hearing things that are not really there (having hallucinations).  ? Violent behavior.  ? Anxiety.  ? Sleep problems.  · Needing more and more of a methamphetamine to get the same effect that you want (building up a tolerance).  · Having symptoms of withdrawal when you stop using a methamphetamine. Symptoms of withdrawal include:  ? Inability to feel pleasure (anhedonia).  ? Irritability.  ? Low energy.  ? Restlessness.  ? Bad dreams.  ? Too little or too much sleep.  ? Increased appetite.  How is this diagnosed?  This condition is diagnosed with an assessment. During the assessment, your health care provider will ask about your methamphetamine use and about how it affects your life. You will be diagnosed with the condition if you have had at least two symptoms of this condition within a 12-month period. How severe the condition is depends on how many symptoms you have:  · If you have 2 or 3 symptoms, your condition is mild.  · If you have 4 or 5 symptoms, your condition is moderate.  · If you have 6 or more symptoms, your condition is severe.  Your health care provider may perform a physical exam or do lab tests to see if you have physical problems resulting from methamphetamine use. Your health care provider may also screen for drug use and refer you to a mental health professional for evaluation.  How is this treated?  Treatment for this condition may involve:  · Immediate care. This treatment addresses your symptoms and immediate needs. It helps prevent or minimize damage from any physical or mental problems that are related to your  methamphetamine use.  · Medicines to treat related disorders.  · Long-term substance abuse treatment. This type helps you stop using methamphetamines. It is usually provided by mental health professionals with training in substance use disorders. It usually involves a combination of the following:  ? Counseling. This treatment is also called talk therapy. It is provided by substance use treatment counselors. A counselor can address the reasons you use methamphetamines and suggest ways to keep you from using methamphetamines again. The goals of talk therapy are to find healthy activities and ways to cope with stress, identify and avoid what triggers your methamphetamine use, and help you learn how to handle cravings.  ? Support groups. Support groups are run by people who have quit using stimulants. They provide emotional support, advice, and guidance.  Follow these instructions at home:    · Take over-the-counter and prescription medicines only as told by your health care provider.  · Check with your health care provider before starting any new medicines.  · Keep all follow-up visits as told by your health care provider. This is important.  · Take care of your teeth by:  ? Brushing and flossing your teeth. Do this two times a day.  ? Using an antibacterial mouthwash.  ? Avoiding sugary drinks.  · Do not use any products that contain nicotine or tobacco, such as cigarettes and e-cigarettes. If you need help quitting, ask your health care provider  Where to find more information  · [place] on Drug Abuse: www.drugabuse.gov  · Substance Abuse and Mental Health Services Administration: www.samhsa.gov  Contact a health care provider if:  · Your symptoms get worse.  · You use methamphetamines again.  · You are not able to take medicines as told.  Get help right away if:  · You have serious thoughts about hurting yourself or others.  · You have a seizure.  · You have chest pain.  · You have sudden  weakness.  · You lose some of your vision.  · You lose some of your speech.  If you ever feel like you may hurt yourself or others, or have thoughts about taking your own life, get help right away. You can go to your nearest emergency department or call:  · Your local emergency services (911 in the [place]  · A suicide crisis helpline, such as the National Suicide Prevention Lifeline at 1-247.306.5027. This is open 24 hours a day.  This information is not intended to replace advice given to you by your health care provider. Make sure you discuss any questions you have with your health care provider.  Document Released: 08/23/2005 Document Revised: 11/30/2018 Document Reviewed: 09/29/2017  [employer] Patient Education © 2020 [employer]        Heart Failure, Self Care  Heart failure is a serious condition. This document explains the things you need to do to take care of yourself after a heart failure diagnosis. You may be asked to change your diet, take certain medicines, and make other lifestyle changes in order to stay as healthy as possible. Your health care provider may also give you more specific instructions. If you have problems or questions, contact your health care provider.  What are the risks?  Having heart failure puts you at higher risk for certain problems. These problems can get worse if you do not take good care of yourself. Problems may include:  · Blood clotting problems. This may cause a stroke.  · Damage to the kidneys, liver, or lungs.  · Abnormal heart rhythms.  Supplies needed:  · Scale for monitoring weight.  · Blood pressure monitor.  · Notebook.  · Medicines.  How to care for yourself when you have heart failure  Medicines  Take over-the-counter and prescription medicines only as told by your health care provider. Medicines reduce the workload of your heart, slow the progression of heart failure, and improve symptoms. Take your medicines every day.  · Do not stop taking your medicine unless  your health care provider tells you to do so.  · Do not skip any dose of medicine.  · Refill your prescriptions before you run out of medicine.  Eating and drinking    · Eat heart-healthy foods. Talk with a dietitian to make an eating plan that is right for you.  ? Choose foods that contain no trans fat and are low in saturated fat and cholesterol. Healthy choices include fresh or frozen fruits and vegetables, fish, lean meats, legumes, fat-free or low-fat dairy products, and whole-grain or high-fiber foods.  ? Limit salt (sodium) if told by your health care provider. Sodium restriction may reduce symptoms of heart failure. Ask a dietitian to recommend heart-healthy seasonings.  ? Use healthy cooking methods instead of frying. Healthy methods include roasting, grilling, broiling, baking, poaching, steaming, and stir-frying.  · Limit your fluid intake, if directed by your health care provider. Fluid restriction may reduce symptoms of heart failure.  Alcohol use  · Do not drink alcohol if:  ? Your health care provider tells you not to drink.  ? Your heart was damaged by alcohol, or you have severe heart failure.  ? You are pregnant, may be pregnant, or are planning to become pregnant.  · If you drink alcohol:  ? Limit how much you use to:  § 0-1 drink a day for women.  § 0-2 drinks a day for men.  ? Be aware of how much alcohol is in your drink. In the [place] one drink equals one 12 oz bottle of beer (355 mL), one 5 oz glass of wine (148 mL), or one 1½ oz glass of hard liquor (44 mL).  Lifestyle    · Do not use any products that contain nicotine or tobacco, such as cigarettes, e-cigarettes, and chewing tobacco. If you need help quitting, ask your health care provider.  ? Do not use nicotine gum or patches before talking to your health care provider.  · Do not use illegal drugs.  · Work with your health care provider to safely reach the right body weight.  · Do physical activity if told by your health care provider.  Talk to your health care provider before you begin an exercise if:  ? You are an older adult.  ? You have severe heart failure.  · Learn to manage stress. If you need help to do this, ask your health care provider.  · Participate in or seek rehabilitation as needed to keep or improve your independence and quality of life.  · Plan rest periods when you get tired.  Monitoring important information    · Weigh yourself every day. This will help you to notice if too much fluid is building up in your body.  ? Weigh yourself every morning after you urinate and before you eat breakfast.  ? Wear the same amount of clothing each time you weigh yourself.  ? Record your daily weight. Provide your health care provider with your weight record.  · Monitor and record your pulse and blood pressure as told by your health care provider.  Dealing with extreme temperatures  · If the weather is extremely hot:  ? Avoid vigorous physical activity.  ? Use air conditioning or fans, or find a cooler location.  ? Avoid caffeine and alcohol.  ? Wear loose-fitting, lightweight, and light-colored clothing.  · If the weather is extremely cold:  ? Avoid vigorous activity.  ? Layer your clothes.  ? Wear mittens or gloves, a hat, and a scarf when you go outside.  ? Avoid alcohol.  Follow these instructions at home:  · Stay up to date with vaccines. Pneumococcal and flu (influenza) vaccines are especially important in preventing infections of the airways.  · Keep all follow-up visits as told by your health care provider. This is important.  Contact a health care provider if you:  · Have a rapid weight gain.  · Have increasing shortness of breath.  · Are unable to participate in your usual physical activities.  · Get tired easily.  · Cough more than normal, especially with physical activity.  · Lose your appetite or feel nauseous.  · Have any swelling or more swelling in areas such as your hands, feet, ankles, or abdomen.  · Are unable to sleep because  it is hard to breathe.  · Feel like your heart is beating quickly (palpitations).  · Become dizzy or light-headed when you stand up.  Get help right away if you:  · Have trouble breathing.  · Notice or your family notices a change in your awareness, such as having trouble staying awake or concentrating.  · Have pain or discomfort in your chest.  · Have an episode of fainting (syncope).  These symptoms may represent a serious problem that is an emergency. Do not wait to see if the symptoms will go away. Get medical help right away. Call your local emergency services (911 in the [place] Do not drive yourself to the hospital.  Summary  · Heart failure is a serious condition. To care for yourself, you may be asked to change your diet, take certain medicines, and make other lifestyle changes.  · Take your medicines every day. Do not stop taking them unless your health care provider tells you to do so.  · Eat heart-healthy foods, such as fresh or frozen fruits and vegetables, fish, lean meats, legumes, fat-free or low-fat dairy products, and whole-grain or high-fiber foods.  · Ask your health care provider if you have any alcohol restrictions. You may have to stop drinking alcohol if you have severe heart failure.  · Contact your health care provider if you notice problems, such as rapid weight gain or a fast heartbeat. Get help right away if you faint, or have chest pain or trouble breathing.  This information is not intended to replace advice given to you by your health care provider. Make sure you discuss any questions you have with your health care provider.  Document Released: 04/01/2020 Document Revised: 03/31/2020 Document Reviewed: 04/01/2020  [employer] Patient Education © 2020 [employer]    Heart Failure Action Plan  A heart failure action plan helps you understand what to do when you have symptoms of heart failure. Follow the plan that was created by you and your health care provider. Review your plan each time  you visit your health care provider.  Red zone  These signs and symptoms mean you should get medical help right away:  · You have trouble breathing when resting.  · You have a dry cough that is getting worse.  · You have swelling or pain in your legs or abdomen that is getting worse.  · You suddenly gain more than 2-3 lb (0.9-1.4 kg) in a day, or more than 5 lb (2.3 kg) in one week. This amount may be more or less depending on your condition.  · You have trouble staying awake or you feel confused.  · You have chest pain.  · You do not have an appetite.  · You pass out.  If you experience any of these symptoms:  · Call your local emergency services (911 in the [place] right away or seek help at the emergency department of the nearest hospital.  Yellow zone  These signs and symptoms mean your condition may be getting worse and you should make some changes:  · You have trouble breathing when you are active or you need to sleep with extra pillows.  · You have swelling in your legs or abdomen.  · You gain 2-3 lb (0.9-1.4 kg) in one day, or 5 lb (2.3 kg) in one week. This amount may be more or less depending on your condition.  · You get tired easily.  · You have trouble sleeping.  · You have a dry cough.  If you experience any of these symptoms:  · Contact your health care provider within the next day.  · Your health care provider may adjust your medicines.  Green zone  These signs mean you are doing well and can continue what you are doing:  · You do not have shortness of breath.  · You have very little swelling or no new swelling.  · Your weight is stable (no gain or loss).  · You have a normal activity level.  · You do not have chest pain or any other new symptoms.  Follow these instructions at home:  · Take over-the-counter and prescription medicines only as told by your health care provider.  · Weigh yourself daily. Your target weight is __________ lb (__________ kg).  ? Call your health care provider if you gain more  than __________ lb (__________ kg) in a day, or more than __________ lb (__________ kg) in one week.  · Eat a heart-healthy diet. Work with a diet and nutrition specialist (dietitian) to create an eating plan that is best for you.  · Keep all follow-up visits as told by your health care provider. This is important.  Where to find more information  · American Heart Association: www.heart.org  Summary  · Follow the action plan that was created by you and your health care provider.  · Get help right away if you have any symptoms in the Red zone.  This information is not intended to replace advice given to you by your health care provider. Make sure you discuss any questions you have with your health care provider.  Document Released: 01/27/2018 Document Revised: 11/30/2018 Document Reviewed: 01/27/2018  [employer] Patient Education © 2020 [employer]        Heart Failure, Diagnosis    Heart failure means that your heart is not able to pump blood in the right way. This makes it hard for your body to work well. Heart failure is usually a long-term (chronic) condition. You must take good care of yourself and follow your treatment plan from your doctor.  What are the causes?  This condition may be caused by:  · High blood pressure.  · Build up of cholesterol and fat in the arteries.  · Heart attack. This injures the heart muscle.  · Heart valves that do not open and close properly.  · Damage of the heart muscle. This is also called cardiomyopathy.  · Lung disease.  · Abnormal heart rhythms.  What increases the risk?  The risk of heart failure goes up as a person ages. This condition is also more likely to develop in people who:  · Are overweight.  · Are male.  · Smoke or chew tobacco.  · Abuse alcohol or illegal drugs.  · Have taken medicines that can damage the heart.  · Have diabetes.  · Have abnormal heart rhythms.  · Have thyroid problems.  · Have low blood counts (anemia).  What are the signs or symptoms?  Symptoms of  this condition include:  · Shortness of breath.  · Coughing.  · Swelling of the feet, ankles, legs, or belly.  · Losing weight for no reason.  · Trouble breathing.  · Waking from sleep because of the need to sit up and get more air.  · Rapid heartbeat.  · Being very tired.  · Feeling dizzy, or feeling like you may pass out (faint).  · Having no desire to eat.  · Feeling like you may vomit (nauseous).  · Peeing (urinating) more at night.  · Feeling confused.  How is this treated?         This condition may be treated with:  · Medicines. These can be given to treat blood pressure and to make the heart muscles stronger.  · Changes in your daily life. These may include eating a healthy diet, staying at a healthy body weight, quitting tobacco and illegal drug use, or doing exercises.  · Surgery. Surgery can be done to open blocked valves, or to put devices in the heart, such as pacemakers.  · A donor heart (heart transplant). You will receive a healthy heart from a donor.  Follow these instructions at home:  · Treat other conditions as told by your doctor. These may include high blood pressure, diabetes, thyroid disease, or abnormal heart rhythms.  · Learn as much as you can about heart failure.  · Get support as you need it.  · Keep all follow-up visits as told by your doctor. This is important.  Summary  · Heart failure means that your heart is not able to pump blood in the right way.  · This condition is caused by high blood pressure, heart attack, or damage of the heart muscle.  · Symptoms of this condition include shortness of breath and swelling of the feet, ankles, legs, or belly. You may also feel very tired or feel like you may vomit.  · You may be treated with medicines, surgery, or changes in your daily life.  · Treat other health conditions as told by your doctor.  This information is not intended to replace advice given to you by your health care provider. Make sure you discuss any questions you have with  your health care provider.  Document Released: 09/26/2009 Document Revised: 03/06/2020 Document Reviewed: 03/06/2020  [employer] Patient Education © 2020 [employer]    Heart-Healthy Eating Plan  Heart-healthy meal planning includes:  · Eating less unhealthy fats.  · Eating more healthy fats.  · Making other changes in your diet.  Talk with your doctor or a diet specialist (dietitian) to create an eating plan that is right for you.  What is my plan?  Your doctor may recommend an eating plan that includes:  · Total fat: ______% or less of total calories a day.  · Saturated fat: ______% or less of total calories a day.  · Cholesterol: less than _________mg a day.  What are tips for following this plan?  Cooking  Avoid frying your food. Try to bake, boil, grill, or broil it instead. You can also reduce fat by:  · Removing the skin from poultry.  · Removing all visible fats from meats.  · Steaming vegetables in water or broth.  Meal planning    · At meals, divide your plate into four equal parts:  ? Fill one-half of your plate with vegetables and green salads.  ? Fill one-fourth of your plate with whole grains.  ? Fill one-fourth of your plate with lean protein foods.  · Eat 4-5 servings of vegetables per day. A serving of vegetables is:  ? 1 cup of raw or cooked vegetables.  ? 2 cups of raw leafy greens.  · Eat 4-5 servings of fruit per day. A serving of fruit is:  ? 1 medium whole fruit.  ? ¼ cup of dried fruit.  ? ½ cup of fresh, frozen, or canned fruit.  ? ½ cup of 100% fruit juice.  · Eat more foods that have soluble fiber. These are apples, broccoli, carrots, beans, peas, and barley. Try to get 20-30 g of fiber per day.  · Eat 4-5 servings of nuts, legumes, and seeds per week:  ? 1 serving of dried beans or legumes equals ½ cup after being cooked.  ? 1 serving of nuts is ¼ cup.  ? 1 serving of seeds equals 1 tablespoon.  General information  · Eat more home-cooked food. Eat less restaurant, buffet, and fast  food.  · Limit or avoid alcohol.  · Limit foods that are high in starch and sugar.  · Avoid fried foods.  · Lose weight if you are overweight.  · Keep track of how much salt (sodium) you eat. This is important if you have high blood pressure. Ask your doctor to tell you more about this.  · Try to add vegetarian meals each week.  Fats  · Choose healthy fats. These include olive oil and canola oil, flaxseeds, walnuts, almonds, and seeds.  · Eat more omega-3 fats. These include salmon, mackerel, sardines, tuna, flaxseed oil, and ground flaxseeds. Try to eat fish at least 2 times each week.  · Check food labels. Avoid foods with trans fats or high amounts of saturated fat.  · Limit saturated fats.  ? These are often found in animal products, such as meats, butter, and cream.  ? These are also found in plant foods, such as palm oil, palm kernel oil, and coconut oil.  · Avoid foods with partially hydrogenated oils in them. These have trans fats. Examples are stick margarine, some tub margarines, cookies, crackers, and other baked goods.  What foods can I eat?  Fruits  All fresh, canned (in natural juice), or frozen fruits.  Vegetables  Fresh or frozen vegetables (raw, steamed, roasted, or grilled). Green salads.  Grains  Most grains. Choose whole wheat and whole grains most of the time. Rice and pasta, including brown rice and pastas made with whole wheat.  Meats and other proteins  Lean, well-trimmed beef, veal, pork, and lamb. Chicken and turkey without skin. All fish and shellfish. Wild duck, rabbit, pheasant, and venison. Egg whites or low-cholesterol egg substitutes. Dried beans, peas, lentils, and tofu. Seeds and most nuts.  Dairy  Low-fat or nonfat cheeses, including ricotta and mozzarella. Skim or 1% milk that is liquid, powdered, or evaporated. Buttermilk that is made with low-fat milk. Nonfat or low-fat yogurt.  Fats and oils  Non-hydrogenated (trans-free) margarines. Vegetable oils, including soybean, sesame,  sunflower, olive, peanut, safflower, corn, canola, and cottonseed. Salad dressings or mayonnaise made with a vegetable oil.  Beverages  Mineral water. Coffee and tea. Diet carbonated beverages.  Sweets and desserts  Sherbet, gelatin, and fruit ice. Small amounts of dark chocolate.  Limit all sweets and desserts.  Seasonings and condiments  All seasonings and condiments.  The items listed above may not be a complete list of foods and drinks you can eat. Contact a dietitian for more options.  What foods should I avoid?  Fruits  Canned fruit in heavy syrup. Fruit in cream or butter sauce. Fried fruit. Limit coconut.  Vegetables  Vegetables cooked in cheese, cream, or butter sauce. Fried vegetables.  Grains  Breads that are made with saturated or trans fats, oils, or whole milk. Croissants. Sweet rolls. Donuts. High-fat crackers, such as cheese crackers.  Meats and other proteins  Fatty meats, such as hot dogs, ribs, sausage, major, rib-eye roast or steak. High-fat deli meats, such as salami and bologna. Caviar. Domestic duck and goose. Organ meats, such as liver.  Dairy  Cream, sour cream, cream cheese, and creamed cottage cheese. Whole-milk cheeses. Whole or 2% milk that is liquid, evaporated, or condensed. Whole buttermilk. Cream sauce or high-fat cheese sauce. Yogurt that is made from whole milk.  Fats and oils  Meat fat, or shortening. Cocoa butter, hydrogenated oils, palm oil, coconut oil, palm kernel oil. Solid fats and shortenings, including major fat, salt pork, lard, and butter. Nondairy cream substitutes. Salad dressings with cheese or sour cream.  Beverages  Regular sodas and juice drinks with added sugar.  Sweets and desserts  Frosting. Pudding. Cookies. Cakes. Pies. Milk chocolate or white chocolate. Buttered syrups. Full-fat ice cream or ice cream drinks.  The items listed above may not be a complete list of foods and drinks to avoid. Contact a dietitian for more information.  Summary  · Heart-healthy  meal planning includes eating less unhealthy fats, eating more healthy fats, and making other changes in your diet.  · Eat a balanced diet. This includes fruits and vegetables, low-fat or nonfat dairy, lean protein, nuts and legumes, whole grains, and heart-healthy oils and fats.  This information is not intended to replace advice given to you by your health care provider. Make sure you discuss any questions you have with your health care provider.  Document Released: 06/18/2013 Document Revised: 02/21/2019 Document Reviewed: 01/25/2019  [employer] Patient Education © 2020 [employer]    Discharge Instructions    Discharged to home by car with relative. Discharged via wheelchair, hospital escort: Yes.  Special equipment needed: Not Applicable    Be sure to schedule a follow-up appointment with your primary care doctor or any specialists as instructed.     Discharge Plan:   Influenza Vaccine Indication: Patient Refuses    I understand that a diet low in cholesterol, fat, and sodium is recommended for good health. Unless I have been given specific instructions below for another diet, I accept this instruction as my diet prescription.   Other diet: heart failure diet as listed above    Special Instructions:   HF Patient Discharge Instructions  · Monitor your weight daily, and maintain a weight chart, to track your weight changes.   · Activity as tolerated, unless your Doctor has ordered otherwise. Other activity order: as tolerated.  · Follow a low fat, low cholesterol, low salt diet unless instructed otherwise by your Doctor. Read the labels on the back of food products and track your intake of fat, cholesterol and salt.   · Fluid Restriction No. If a Fluid Restriction has been ordered by your Doctor, measure fluids with a measuring cup to ensure that you are not exceeding the restriction.   · No smoking.  · Oxygen No. If your Doctor has ordered that you wear Oxygen at home, it is important to wear it as  ordered.  · Did you receive an explanation from staff on the importance of taking each of your medications and why it is necessary to keep taking them unless your doctor says to stop? Yes  · Were all of your questions answered about how to manage your heart failure and what to do if you have increased signs and symptoms after you go home? Yes  · Do you feel like your heart failure care team involved you in the care treatment plan and allowed you to make decisions regarding your care while in the hospital and addressed any discharge needs you might have? Yes    See the educational handout provided at discharge for more information on monitoring your daily weight, activity and diet. This also explains more about Heart Failure, symptoms of a flare-up and some of the tests that you have undergone.     Warning Signs of a Flare-Up include:  · Swelling in the ankles or lower legs.  · Shortness of breath, while at rest, or while doing normal activities.   · Shortness of breath at night when in bed, or coughing in bed.   · Requiring more pillows to sleep at night, or needing to sit up at night to sleep.  · Feeling weak, dizzy or fatigued.     When to call your Doctor:  · Call [facility] seven days a week from 8:00 a.m. to 8:00 p.m. for medical questions [phone].  · Call your Primary Care Physician or Cardiologist if:   1. You experience any pain radiating to your jaw or neck.  2. You have any difficulty breathing.  3. You experience weight gain of 3 lbs in a day or 5 lbs in a week.   4. You feel any palpitations or irregular heartbeats.  5. You become dizzy or lose consciousness.   If you have had an angiogram or had a pacemaker or AICD placed, and experience:  1. Bleeding, drainage or swelling at the surgical / puncture site.  2. Fever greater than 100.0 F  3. Shock from internal defibrillator.  4. Cool and / or numb extremities.      · Is patient discharged on Warfarin / Coumadin?   No     Depression /  Suicide Risk    As you are discharged from this [facility] facility, it is important to learn how to keep safe from harming yourself.    Recognize the warning signs:  · Abrupt changes in personality, positive or negative- including increase in energy   · Giving away possessions  · Change in eating patterns- significant weight changes-  positive or negative  · Change in sleeping patterns- unable to sleep or sleeping all the time   · Unwillingness or inability to communicate  · Depression  · Unusual sadness, discouragement and loneliness  · Talk of wanting to die  · Neglect of personal appearance   · Rebelliousness- reckless behavior  · Withdrawal from people/activities they love  · Confusion- inability to concentrate     If you or a loved one observes any of these behaviors or has concerns about self-harm, here's what you can do:  · Talk about it- your feelings and reasons for harming yourself  · Remove any means that you might use to hurt yourself (examples: pills, rope, extension cords, firearm)  · Get professional help from the community (Mental Health, Substance Abuse, psychological counseling)  · Do not be alone:Call your Safe Contact- someone whom you trust who will be there for you.  · Call your local CRISIS HOTLINE 360-8749 or 385-901-0214  · Call your local Children's Mobile Crisis Response Team [place] (450) 922-0755 or www.[employer]  · Call the toll free National Suicide Prevention Hotlines   · National Suicide Prevention Lifeline 504-745-ENJX (1711)  · National Hope Line Network 800-SUICIDE (565-4923)

## 2021-02-19 NOTE — PROGRESS NOTES
Monitor Summary     SR [id number]  HR up to 138 while ambulating in room, pt asymptomatic. No CP or SOB   .14/.08/.36  (R) PAC
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
## 2021-02-19 NOTE — PROGRESS NOTES
Patient to be discharged today - patient aware and agreeable to plan. D/c instructions reviewed with patient - ?'s/concerns answered. Heart failure education provided. Med to beds delivered to patient.

## 2021-02-19 NOTE — CARE PLAN
Problem: Knowledge Deficit  Goal: Knowledge of disease process/condition, treatment plan, diagnostic tests, and medications will improve  Outcome: PROGRESSING AS EXPECTED     Problem: Psychosocial Needs:  Goal: Level of anxiety will decrease  Outcome: PROGRESSING AS EXPECTED

## 2021-02-19 NOTE — PROGRESS NOTES
Bedside report received from Martina Swain R.N. Patient reports no pain but claims SOB from time to time. Bed locked and in low position, call light within reach. Hourly rounding in place. No further needs at this time.

## 2021-02-19 NOTE — PROGRESS NOTES
Bedside report received from Alek MEDINA and ADAM Hansen. POC discussed with pt; all questions answered at this time. No reports of pain at this time. Pt resting comfortably in bed. Fall precautions in place. Bed locked in lowest position. Bed alarm on. Call light within reach.

## 2021-02-23 ENCOUNTER — OFFICE VISIT (OUTPATIENT)
Dept: CARDIOLOGY | Facility: MEDICAL CENTER | Age: 46
End: 2021-02-23
Payer: MEDICAID

## 2021-02-23 VITALS
HEART RATE: 89 BPM | SYSTOLIC BLOOD PRESSURE: 158 MMHG | WEIGHT: 158 LBS | DIASTOLIC BLOOD PRESSURE: 102 MMHG | BODY MASS INDEX: 24.8 KG/M2 | OXYGEN SATURATION: 98 % | HEIGHT: 67 IN

## 2021-02-23 DIAGNOSIS — Z72.0 TOBACCO ABUSE: ICD-10-CM

## 2021-02-23 DIAGNOSIS — I25.10 CORONARY ARTERY DISEASE INVOLVING NATIVE HEART WITHOUT ANGINA PECTORIS, UNSPECIFIED VESSEL OR LESION TYPE: ICD-10-CM

## 2021-02-23 DIAGNOSIS — Z91.199 NONADHERENCE TO MEDICAL TREATMENT: ICD-10-CM

## 2021-02-23 DIAGNOSIS — Z82.49 FAMILY HISTORY OF CORONARY ARTERY DISEASE IN FATHER: ICD-10-CM

## 2021-02-23 DIAGNOSIS — Z82.49 FAMILY HISTORY OF CORONARY ARTERY DISEASE IN SISTER: ICD-10-CM

## 2021-02-23 DIAGNOSIS — Z95.5 STENTED CORONARY ARTERY: ICD-10-CM

## 2021-02-23 DIAGNOSIS — I10 ESSENTIAL HYPERTENSION: ICD-10-CM

## 2021-02-23 DIAGNOSIS — F17.200 TOBACCO DEPENDENCE: ICD-10-CM

## 2021-02-23 DIAGNOSIS — I25.5 ISCHEMIC CARDIOMYOPATHY: ICD-10-CM

## 2021-02-23 DIAGNOSIS — E78.49 OTHER HYPERLIPIDEMIA: ICD-10-CM

## 2021-02-23 DIAGNOSIS — I21.11 ST ELEVATION MYOCARDIAL INFARCTION INVOLVING RIGHT CORONARY ARTERY (HCC): ICD-10-CM

## 2021-02-23 PROCEDURE — 99214 OFFICE O/P EST MOD 30 MIN: CPT | Performed by: INTERNAL MEDICINE

## 2021-02-23 RX ORDER — CARVEDILOL 25 MG/1
25 TABLET ORAL 2 TIMES DAILY WITH MEALS
Qty: 180 TABLET | Refills: 3 | Status: SHIPPED | OUTPATIENT
Start: 2021-02-23

## 2021-02-23 RX ORDER — ATORVASTATIN CALCIUM 80 MG/1
80 TABLET, FILM COATED ORAL EVERY EVENING
Qty: 30 TABLET | Refills: 11 | Status: SHIPPED | OUTPATIENT
Start: 2021-02-23 | End: 2021-05-24

## 2021-02-23 RX ORDER — TRAZODONE HYDROCHLORIDE 50 MG/1
50 TABLET ORAL NIGHTLY PRN
Qty: 30 TABLET | Refills: 11 | Status: SHIPPED | OUTPATIENT
Start: 2021-02-23

## 2021-02-23 RX ORDER — LISINOPRIL 20 MG/1
20 TABLET ORAL DAILY
Qty: 30 TABLET | Refills: 11 | Status: SHIPPED | OUTPATIENT
Start: 2021-02-23

## 2021-02-23 RX ORDER — PRASUGREL 10 MG/1
10 TABLET, FILM COATED ORAL DAILY
Qty: 30 TABLET | Refills: 11 | Status: SHIPPED | OUTPATIENT
Start: 2021-02-23

## 2021-02-23 RX ORDER — ATORVASTATIN CALCIUM 80 MG/1
80 TABLET, FILM COATED ORAL EVERY EVENING
Qty: 30 TABLET | Refills: 11 | Status: SHIPPED | OUTPATIENT
Start: 2021-02-23

## 2021-02-23 ASSESSMENT — FIBROSIS 4 INDEX: FIB4 SCORE: 1.17

## 2021-02-23 NOTE — PATIENT INSTRUCTIONS
Checking Blood Pressure:  -Blood pressure cuff, spend in the $40-65, with good return policy  -It should be automatic, upper arm, measure your arm to get the correct size, probably adult Large but your arm should be under 16.5 cm. If you need an XL cuff, you will have to have it special ordered from a pharmacy or durable medical equipment company.  -Put the cuff in place, rest arm on table near height of your heart, sit quietly for 5 min, legs uncrossed, with back support, then take your blood pressure, write it down, keep a log  -Check no more than 1 time day, maybe 4-5 times per week, try different times of day.  -Can bring your cuff to at least one appointment where it can be calibrated to a manual cuff if you are concerned.  -Goal blood pressure is at least under 130/80, ideally under 120/80.  If you think your BP is overall too high, let us know in the office, we can adjust medications, can use [employer] or call the [facility] [employer] office: [phone].    -Salt=sodium=sea salt, guidelines say stay under 2,500 mg daily but I ask for under 4,000 mg daily.  Get salt smart, start looking at labels, count it up.  Salt is hidden in everything, salad dressing, sauces, cheese, most canned food, any processed meat.    Please look into the following diets:    Mediterranean diet.  [facility]  DASH DIET - American Heart Association (particularly for hypertension)  Ornish Diet   Vegan diet (proven to reverse vascular disease)    Resources to learn more:  American Heart Association   Www.Cardiosmart.org, sponsored by the American College of cardiology.      -We now know that lack of exercise is as risky as smoking or having diabetes in terms of your heart health.  Try to perform a moderate intensity exercise which includes brisk walking (swimming, cycling) at least 150 minutes a week or 30 minutes for 5 days in the week.  If you are able to perform more vigorous exercise, 75 minutes/week is  sufficient.    -No shoveling snow was a double whammy because the cold air makes the heart artery shrink down and then you combine that with straining to lift heavy amounts of snow and a shovel.  If you have to remove snow you want to brush it or do very small shovel falls but only when absolutely necessary.    -In general, we want you to avoid lifting more than 50 pounds and avoid prolonged straining like power weight  would do because it dries up the pressure in the aorta and the heart arteries very high and can provoke heart attacks.    -Your heart medications are powerful medications    -Stay on Prasugrel for one year, always stay on aspirin.    -Stay on your atorvastatin, a powerful vascular medication.    -Your pump function is down, we need it to be over 35%, will need to check again April 21, 2021, if it is under 35%, we talk about a defibrillator to watch over the electrical heart system.    -Normal EF- ejection fraction is 55-60%, you are at 30-35%. You are on heart medications to help heal the pump, spironolactone, carvedilol, lisinopril    -The carvedilol also protects the electrical system of the heart.  If you blood pressure remains elevated we will probably change your metoprolol to carvedilol, still helps the heart but better at blood pressure control.    -Nitroglycerin under the tongue every 5 minutes for 3 pills (0 min, 5 min, 10 min)  If you are having intense pain that does not go away after 15 minutes and 3 nitro pills, consider going to the ED.  It is ok to take nitro to test a symptom that you are not sure about.  It can cause a headache but the headache does go away.

## 2021-02-23 NOTE — PROGRESS NOTES
Subjective:   Chief Complaint:   Chief Complaint   Patient presents with   • Coronary Artery Disease     F/V Dx: STEMI (ST elevation myocardial infarction) (HCC)   • HTN (Controlled)   • Shortness of Breath   • Cardiomyopathy (Ischemic)       Immanuel Borges is a 46 y.o. male who returns for CAD, MI, stents, tobacco, methamphetamine use, prior non adherance.    RCA STEMI status post successful PCI (4/18/2020).  Then inferior STEMI 1-21-21  -Underwent coronary angiography 1/21/2021 revealed recurrent thrombosis of his prior stent consistent with medical noncompliance.  -Status post successful PCI of the RCA using 2 overlapping drug-eluting stents.  -DAPT, aspirin 81, Effient 10 mg, along with atorvastatin 80 mg, lisinopril 10 mg, metoprolol 12.5 mg twice daily     EF down, 30-35%, NHYC 1-2, stage C,     U tox positive for amphetamine, benzodiazepines, cannabinoid  -Ongoing counseling.  -Reiterated cardiotoxicity of methamphetamine.     Hypertension, probably for years.  -Lisinopril, metoprolol  -Uptitrate above for target blood pressure less than 130/80     Father with MI early 60s, heavy etoh.  Mom, sister with murmur.  Sister, MI, stents    Has not needed nitro.  He is not limited by chest pain, pressure or tightness with activity.   No significant dyspnea on exertion, orthopnea or lower extremity swelling.   No significant palpitations, lightheadedness, or presyncope/syncope.   No symptoms of leg claudication.   No stroke/TIA like symptoms.        Here with his sister who is supportive.       DATA REVIEWED by me:  ECG (my personal interpretation) 2-17-21  Sinus, 77, LVH    Echo 2-19-21  Moderately reduced left ventricular systolic function.  Left ventricular ejection fraction is visually estimated to be 30-35%.  Moderate concentric left ventricular hypertrophy with severe septal   hypertrophy .  Global hypokinesis with inferior wall akinesis/dyskinesis.  Compared to the images of the study done 8/18/2020 there has  been a   reduction in left ventricular function, progressive left ventricular   hypertrophy with worsening inferior wall motion abnormality.     [facility] 1-21-21  1.  Acute inferior STEMI due to late stent thrombosis  2.  Successful PCI of the RCA using 2 overlapping drug-eluting stents  3.  The patient is fully revascularized  4.  Normal LVEDP  5.  Mild to moderate impairment in LV systolic function    Cardiac Catheterization:   4/18/2020  PTCA/PCI/BRIAN proximal RCA, single-vessel high-grade proximal RCA.  POSTPROCEDURE DIAGNOSES:    1.  Single-vessel coronary artery disease with high-grade proximal right   coronary artery stenosis.    2.  Successful percutaneous transluminal coronary angioplasty/stent placement   of the proximal right coronary artery with 3.0x22 mm Joel drug-eluting stent.   3.  Reduced left ventricular systolic function with ejection fraction of 45%.   4.  Elevated left ventricular end-diastolic pressure.      Most recent labs:       Lab Results   Component Value Date/Time    WBC 5.5 02/18/2021 01:31 AM    HEMOGLOBIN 17.1 02/18/2021 01:31 AM    HEMATOCRIT 49.8 02/18/2021 01:31 AM    MCV 81.4 02/18/2021 01:31 AM    INR 1.41 (H) 01/21/2021 04:45 PM      Lab Results   Component Value Date/Time    SODIUM 134 (L) 02/19/2021 01:55 AM    POTASSIUM 3.7 02/19/2021 01:55 AM    CHLORIDE 100 02/19/2021 01:55 AM    CO2 22 02/19/2021 01:55 AM    GLUCOSE 119 (H) 02/19/2021 01:55 AM    BUN 30 (H) 02/19/2021 01:55 AM    CREATININE 1.53 (H) 02/19/2021 01:55 AM      Lab Results   Component Value Date/Time    ASTSGOT 18 02/17/2021 10:20 AM    ALTSGPT 13 02/17/2021 10:20 AM    ALBUMIN 4.2 02/17/2021 10:20 AM      Lab Results   Component Value Date/Time    CHOLSTRLTOT 148 01/22/2021 02:19 AM    LDL 83 01/22/2021 02:19 AM    HDL 38 (A) 01/22/2021 02:19 AM    TRIGLYCERIDE 137 01/22/2021 02:19 AM     No results for input(s): NTPROBNP, TROPONINT in the last 72 hours.      Past Medical History:   Diagnosis Date   • STEMI (ST  elevation myocardial infarction) (HCC)      Past Surgical History:   Procedure Laterality Date   • STENT PLACEMENT       Family History   Problem Relation Age of Onset   • Heart Disease Father         MI in 60s, heavy etoh   • Heart Disease Sister         MI, stents     Social History     Socioeconomic History   • Marital status: Single     Spouse name: Not on file   • Number of children: Not on file   • Years of education: Not on file   • Highest education level: Not on file   Occupational History   • Not on file   Tobacco Use   • Smoking status: Light Tobacco Smoker     Types: Cigarettes   • Smokeless tobacco: Never Used   Substance and Sexual Activity   • Alcohol use: Yes     Comment: occasionally.    • Drug use: Yes     Comment: marijuana   • Sexual activity: Not on file   Other Topics Concern   • Not on file   Social History Narrative   • Not on file     Social Determinants of Health     Financial Resource Strain:    • Difficulty of Paying Living Expenses:    Food Insecurity:    • Worried About Running Out of Food in the Last Year:    • Ran Out of Food in the Last Year:    Transportation Needs:    • Lack of Transportation (Medical):    • Lack of Transportation (Non-Medical):    Physical Activity:    • Days of Exercise per Week:    • Minutes of Exercise per Session:    Stress:    • Feeling of Stress :    Social Connections:    • Frequency of Communication with Friends and Family:    • Frequency of Social Gatherings with Friends and Family:    • Attends [employer] Services:    • Active Member of Clubs or Organizations:    • Attends Club or Organization Meetings:    • Marital Status:    Intimate Partner Violence:    • Fear of Current or Ex-Partner:    • Emotionally Abused:    • Physically Abused:    • Sexually Abused:      No Known Allergies    Current Outpatient Medications   Medication Sig Dispense Refill   • carvedilol (COREG) 25 MG Tab Take 1 tablet by mouth 2 times a day, with meals. 180 tablet 3   • traZODone  "(DESYREL) 50 MG Tab Take 1 tablet by mouth at bedtime as needed for Sleep. 30 tablet 11   • lisinopril (PRINIVIL) 20 MG Tab Take 1 tablet by mouth every day. 30 tablet 11   • atorvastatin (LIPITOR) 80 MG tablet Take 1 tablet by mouth every evening for 90 days. 30 tablet 11   • atorvastatin (LIPITOR) 80 MG tablet Take 1 tablet by mouth every evening. 30 tablet 11   • prasugrel (EFFIENT) 10 MG Tab Take 1 tablet by mouth every day. 30 tablet 11   • famotidine (PEPCID) 20 MG Tab Take 1 tablet by mouth 2 Times a Day for 30 days. 60 tablet 2   • prasugrel (EFFIENT) 10 MG Tab Take 1 tablet by mouth every day for 90 days. 90 tablet 1   • spironolactone (ALDACTONE) 25 MG Tab Take 1 tablet by mouth every day for 30 days. 30 tablet 2   • aspirin 81 MG EC tablet Take 1 tablet by mouth every day for 90 days. 90 tablet 1   • nitroglycerin (NITROSTAT) 0.4 MG SL Tab Place 1 tablet under the tongue as needed for Chest Pain (may take one tab every five minutes up to three times in 15 minutes. if chest pain not resolved, call 911 or go to the emergency room immediately.) for up to 30 days. 25 tablet 2   • famotidine (PEPCID) 20 MG Tab Take 1 Tab by mouth 2 times a day. 90 Tab 1   • aspirin 81 MG EC tablet Take 1 Tab by mouth every day. 90 Tab 3   • nitroglycerin (NITROSTAT) 0.4 MG SL Tab Place 1 Tab under the tongue as needed for Chest Pain (up to 3 doses (if SBP greater than 90 mmHg)). 25 Tab 1     No current facility-administered medications for this visit.       ROS  All others systems reviewed and negative.     Objective:     /102 (BP Location: Left arm, Patient Position: Sitting, BP Cuff Size: Adult)   Pulse 89   Ht 1.702 m (5' 7\")   Wt 71.7 kg (158 lb)   SpO2 98%  Body mass index is 24.75 kg/m².    General: No acute distress. Well nourished.  HEENT: EOM grossly intact, no scleral icterus, no pharyngeal erythema.   Neck:  No JVD, no bruits, trachea midline  CVS: RRR. Normal S1, S2. No M/R/G. No LE edema.  2+ radial " pulses, 2+ PT pulses  Resp: CTAB. No wheezing or crackles/rhonchi. Normal respiratory effort.  Abdomen: Soft, NT, no bo hepatomegaly.  MSK/Ext: No clubbing or cyanosis.  Skin: Warm and dry, no rashes.  Neurological: CN III-XII grossly intact. No focal deficits.   Psych: A&O x 3, appropriate affect, good judgement        Assessment:     1. Coronary artery disease involving native heart without angina pectoris, unspecified vessel or lesion type     2. ST elevation myocardial infarction involving right coronary artery (HCC)  prasugrel (EFFIENT) 10 MG Tab   3. Tobacco abuse     4. Other hyperlipidemia     5. Essential hypertension     6. Tobacco dependence     7. Stented coronary artery     8. Nonadherence to medical treatment     9. Family history of coronary artery disease in sister     10. Family history of coronary artery disease in father     11. Ischemic cardiomyopathy  EC-ECHOCARDIOGRAM COMPLETE W/O CONT       Medical Decision Making:  Today's Assessment / Status / Plan:     -Cont secondary prevention DAPT for 1 year  -Cont statin, goal LDL 70  -Cont BB, change to coreg  -Needs BP control, change meds, see below  -EF down, 30-35%, NHYC 1-2, stage C, needs echo April 21, 2021, looks euvolemic today  -Eval for ICD  -See us after echo or new cardiologist in [place]  -I gave trazodone but needs PCP  -RTC 2 months after echo    Written instructions given today:    Checking Blood Pressure:  -Blood pressure cuff, spend in the $40-65, with good return policy  -It should be automatic, upper arm, measure your arm to get the correct size, probably adult Large but your arm should be under 16.5 cm. If you need an XL cuff, you will have to have it special ordered from a pharmacy or durable medical equipment company.  -Put the cuff in place, rest arm on table near height of your heart, sit quietly for 5 min, legs uncrossed, with back support, then take your blood pressure, write it down, keep a log  -Check no more than 1  time day, maybe 4-5 times per week, try different times of day.  -Can bring your cuff to at least one appointment where it can be calibrated to a manual cuff if you are concerned.  -Goal blood pressure is at least under 130/80, ideally under 120/80.  If you think your BP is overall too high, let us know in the office, we can adjust medications, can use [facility] or call the [employer] [employer] office: [phone].    -Salt=sodium=sea salt, guidelines say stay under 2,500 mg daily but I ask for under 4,000 mg daily.  Get salt smart, start looking at labels, count it up.  Salt is hidden in everything, salad dressing, sauces, cheese, most canned food, any processed meat.    Please look into the following diets:    Mediterranean diet.  Jamie - [facility]  DASH DIET - American Heart Association (particularly for hypertension)  Ornish Diet   Vegan diet (proven to reverse vascular disease)    Resources to learn more:  American Heart Association   Www.Cardiosmart.org, sponsored by the American College of cardiology.      -We now know that lack of exercise is as risky as smoking or having diabetes in terms of your heart health.  Try to perform a moderate intensity exercise which includes brisk walking (swimming, cycling) at least 150 minutes a week or 30 minutes for 5 days in the week.  If you are able to perform more vigorous exercise, 75 minutes/week is sufficient.    -No shoveling snow was a double whammy because the cold air makes the heart artery shrink down and then you combine that with straining to lift heavy amounts of snow and a shovel.  If you have to remove snow you want to brush it or do very small shovel falls but only when absolutely necessary.    -In general, we want you to avoid lifting more than 50 pounds and avoid prolonged straining like power weight  would do because it dries up the pressure in the aorta and the heart arteries very high and can provoke heart attacks.    -Your heart  medications are powerful medications    -Stay on Prasugrel for one year, always stay on aspirin.    -Stay on your atorvastatin, a powerful vascular medication.    -Your pump function is down, we need it to be over 35%, will need to check again April 21, 2021, if it is under 35%, we talk about a defibrillator to watch over the electrical heart system.    -Normal EF- ejection fraction is 55-60%, you are at 30-35%. You are on heart medications to help heal the pump, spironolactone, carvedilol, lisinopril    -The carvedilol also protects the electrical system of the heart.  If you blood pressure remains elevated we will probably change your metoprolol to carvedilol, still helps the heart but better at blood pressure control.    -Nitroglycerin under the tongue every 5 minutes for 3 pills (0 min, 5 min, 10 min)  If you are having intense pain that does not go away after 15 minutes and 3 nitro pills, consider going to the ED.  It is ok to take nitro to test a symptom that you are not sure about.  It can cause a headache but the headache does go away.        Return in about 2 months (around 4/23/2021).    It is my pleasure to participate in the care of Mr. Borges.  Please do not hesitate to contact me with questions or concerns.    Bonny Rodríguez MD, [facility]  Cardiologist [facility]    Please note that this dictation was created using voice recognition software. I have made every reasonable attempt to correct obvious errors, but it is possible there are errors of grammar and possibly content that I did not discover before finalizing the note.

## 2021-12-31 NOTE — CARE PLAN
Problem: Communication  Goal: The ability to communicate needs accurately and effectively will improve  Outcome: PROGRESSING AS EXPECTED  Note: Pt communicates needs effectively and calls for assistance appropriately.     Problem: Safety  Goal: Will remain free from falls  Outcome: PROGRESSING AS EXPECTED  Note: Pt verbalizes understanding of fall precautions and calls for assistance appropriately. Bed in lowest position and locked. Pt wearing non-slip socks, call light within reach.     
  Problem: Communication  Goal: The ability to communicate needs accurately and effectively will improve  Outcome: PROGRESSING AS EXPECTED  Note: Pt's white board is updated. Pt has been updated on POC. All questions have been answered at this time.      Problem: Safety  Goal: Will remain free from injury  Outcome: PROGRESSING AS EXPECTED  Note: Bed locked in lowest position. Bed alarm on. Treaded socks in use. Call light and belongings within reach. Patient educated to call for assistance. Pt verbalized understanding. Hourly rounding in place.       
  Problem: Communication  Goal: The ability to communicate needs accurately and effectively will improve  Outcome: PROGRESSING AS EXPECTED  Note: Pt's white board is updated. Pt has been updated on POC. All questions have been answered at this time.      Problem: Safety  Goal: Will remain free from injury  Outcome: PROGRESSING AS EXPECTED  Note: Bed locked in lowest position. Bed alarm on. Treaded socks in use. Call light and belongings within reach. Patient educated to call for assistance. Pt verbalized understanding. Hourly rounding in place.       
  Problem: Infection  Goal: Will remain free from infection  Outcome: PROGRESSING AS EXPECTED    Educated patient on the importance of good hand hygiene for self and staff, verbalized understanding.   
  Problem: Knowledge Deficit  Goal: Knowledge of disease process/condition, treatment plan, diagnostic tests, and medications will improve  Outcome: PROGRESSING AS EXPECTED  Note: Pt verbalizes understanding of plan of care and has no questions at this time.  Goal: Knowledge of the prescribed therapeutic regimen will improve  Outcome: PROGRESSING AS EXPECTED  Note: Pt verbalizes understanding of prescribed medications and has no questions at this time.     
sacrum